# Patient Record
Sex: MALE | Race: ASIAN | NOT HISPANIC OR LATINO | URBAN - METROPOLITAN AREA
[De-identification: names, ages, dates, MRNs, and addresses within clinical notes are randomized per-mention and may not be internally consistent; named-entity substitution may affect disease eponyms.]

---

## 2022-10-24 ENCOUNTER — INPATIENT (INPATIENT)
Facility: HOSPITAL | Age: 32
LOS: 2 days | Discharge: HOME | End: 2022-10-27
Attending: INTERNAL MEDICINE | Admitting: INTERNAL MEDICINE

## 2022-10-24 VITALS
SYSTOLIC BLOOD PRESSURE: 161 MMHG | HEART RATE: 118 BPM | DIASTOLIC BLOOD PRESSURE: 90 MMHG | RESPIRATION RATE: 16 BRPM | TEMPERATURE: 98 F | OXYGEN SATURATION: 100 %

## 2022-10-24 DIAGNOSIS — L03.213 PERIORBITAL CELLULITIS: ICD-10-CM

## 2022-10-24 DIAGNOSIS — J32.0 CHRONIC MAXILLARY SINUSITIS: ICD-10-CM

## 2022-10-24 DIAGNOSIS — Z29.9 ENCOUNTER FOR PROPHYLACTIC MEASURES, UNSPECIFIED: ICD-10-CM

## 2022-10-24 LAB
ALBUMIN SERPL ELPH-MCNC: 4.3 G/DL — SIGNIFICANT CHANGE UP (ref 3.3–5)
ALP SERPL-CCNC: 75 U/L — SIGNIFICANT CHANGE UP (ref 40–120)
ALT FLD-CCNC: 23 U/L — SIGNIFICANT CHANGE UP (ref 4–41)
ANION GAP SERPL CALC-SCNC: 10 MMOL/L — SIGNIFICANT CHANGE UP (ref 7–14)
ANISOCYTOSIS BLD QL: SLIGHT — SIGNIFICANT CHANGE UP
AST SERPL-CCNC: 21 U/L — SIGNIFICANT CHANGE UP (ref 4–40)
BASE EXCESS BLDV CALC-SCNC: 5.3 MMOL/L — HIGH (ref -2–3)
BASOPHILS # BLD AUTO: 0 K/UL — SIGNIFICANT CHANGE UP (ref 0–0.2)
BASOPHILS NFR BLD AUTO: 0 % — SIGNIFICANT CHANGE UP (ref 0–2)
BILIRUB SERPL-MCNC: 0.4 MG/DL — SIGNIFICANT CHANGE UP (ref 0.2–1.2)
BLOOD GAS VENOUS COMPREHENSIVE RESULT: SIGNIFICANT CHANGE UP
BUN SERPL-MCNC: 9 MG/DL — SIGNIFICANT CHANGE UP (ref 7–23)
CALCIUM SERPL-MCNC: 9.7 MG/DL — SIGNIFICANT CHANGE UP (ref 8.4–10.5)
CHLORIDE BLDV-SCNC: 99 MMOL/L — SIGNIFICANT CHANGE UP (ref 96–108)
CHLORIDE SERPL-SCNC: 99 MMOL/L — SIGNIFICANT CHANGE UP (ref 98–107)
CO2 BLDV-SCNC: 32.6 MMOL/L — HIGH (ref 22–26)
CO2 SERPL-SCNC: 28 MMOL/L — SIGNIFICANT CHANGE UP (ref 22–31)
CREAT SERPL-MCNC: 0.74 MG/DL — SIGNIFICANT CHANGE UP (ref 0.5–1.3)
CRP SERPL-MCNC: 59.9 MG/L — HIGH
EGFR: 123 ML/MIN/1.73M2 — SIGNIFICANT CHANGE UP
EOSINOPHIL # BLD AUTO: 0 K/UL — SIGNIFICANT CHANGE UP (ref 0–0.5)
EOSINOPHIL NFR BLD AUTO: 0 % — SIGNIFICANT CHANGE UP (ref 0–6)
ERYTHROCYTE [SEDIMENTATION RATE] IN BLOOD: 27 MM/HR — HIGH (ref 1–15)
GAS PNL BLDV: 133 MMOL/L — LOW (ref 136–145)
GIANT PLATELETS BLD QL SMEAR: PRESENT — SIGNIFICANT CHANGE UP
GLUCOSE BLDV-MCNC: 114 MG/DL — HIGH (ref 70–99)
GLUCOSE SERPL-MCNC: 107 MG/DL — HIGH (ref 70–99)
HCO3 BLDV-SCNC: 31 MMOL/L — HIGH (ref 22–29)
HCT VFR BLD CALC: 42.5 % — SIGNIFICANT CHANGE UP (ref 39–50)
HCT VFR BLDA CALC: 43 % — SIGNIFICANT CHANGE UP (ref 39–51)
HGB BLD CALC-MCNC: 14.2 G/DL — SIGNIFICANT CHANGE UP (ref 13–17)
HGB BLD-MCNC: 14 G/DL — SIGNIFICANT CHANGE UP (ref 13–17)
IANC: 13.27 K/UL — HIGH (ref 1.8–7.4)
LACTATE BLDV-MCNC: 1.2 MMOL/L — SIGNIFICANT CHANGE UP (ref 0.5–2)
LYMPHOCYTES # BLD AUTO: 1.86 K/UL — SIGNIFICANT CHANGE UP (ref 1–3.3)
LYMPHOCYTES # BLD AUTO: 10.5 % — LOW (ref 13–44)
MACROCYTES BLD QL: SLIGHT — SIGNIFICANT CHANGE UP
MANUAL SMEAR VERIFICATION: SIGNIFICANT CHANGE UP
MCHC RBC-ENTMCNC: 30.8 PG — SIGNIFICANT CHANGE UP (ref 27–34)
MCHC RBC-ENTMCNC: 32.9 GM/DL — SIGNIFICANT CHANGE UP (ref 32–36)
MCV RBC AUTO: 93.6 FL — SIGNIFICANT CHANGE UP (ref 80–100)
MONOCYTES # BLD AUTO: 1.4 K/UL — HIGH (ref 0–0.9)
MONOCYTES NFR BLD AUTO: 7.9 % — SIGNIFICANT CHANGE UP (ref 2–14)
NEUTROPHILS # BLD AUTO: 14.02 K/UL — HIGH (ref 1.8–7.4)
NEUTROPHILS NFR BLD AUTO: 79 % — HIGH (ref 43–77)
OVALOCYTES BLD QL SMEAR: SLIGHT — SIGNIFICANT CHANGE UP
PCO2 BLDV: 49 MMHG — SIGNIFICANT CHANGE UP (ref 42–55)
PH BLDV: 7.41 — SIGNIFICANT CHANGE UP (ref 7.32–7.43)
PLAT MORPH BLD: ABNORMAL
PLATELET # BLD AUTO: 224 K/UL — SIGNIFICANT CHANGE UP (ref 150–400)
PLATELET COUNT - ESTIMATE: NORMAL — SIGNIFICANT CHANGE UP
PO2 BLDV: 37 MMHG — SIGNIFICANT CHANGE UP
POLYCHROMASIA BLD QL SMEAR: SLIGHT — SIGNIFICANT CHANGE UP
POTASSIUM BLDV-SCNC: 3.5 MMOL/L — SIGNIFICANT CHANGE UP (ref 3.5–5.1)
POTASSIUM SERPL-MCNC: 3.9 MMOL/L — SIGNIFICANT CHANGE UP (ref 3.5–5.3)
POTASSIUM SERPL-SCNC: 3.9 MMOL/L — SIGNIFICANT CHANGE UP (ref 3.5–5.3)
PROT SERPL-MCNC: 7.9 G/DL — SIGNIFICANT CHANGE UP (ref 6–8.3)
RBC # BLD: 4.54 M/UL — SIGNIFICANT CHANGE UP (ref 4.2–5.8)
RBC # FLD: 11.8 % — SIGNIFICANT CHANGE UP (ref 10.3–14.5)
RBC BLD AUTO: ABNORMAL
SAO2 % BLDV: 68.1 % — SIGNIFICANT CHANGE UP
SARS-COV-2 RNA SPEC QL NAA+PROBE: SIGNIFICANT CHANGE UP
SODIUM SERPL-SCNC: 137 MMOL/L — SIGNIFICANT CHANGE UP (ref 135–145)
VARIANT LYMPHS # BLD: 2.6 % — SIGNIFICANT CHANGE UP (ref 0–6)
WBC # BLD: 17.75 K/UL — HIGH (ref 3.8–10.5)
WBC # FLD AUTO: 17.75 K/UL — HIGH (ref 3.8–10.5)

## 2022-10-24 PROCEDURE — 99221 1ST HOSP IP/OBS SF/LOW 40: CPT

## 2022-10-24 PROCEDURE — 93010 ELECTROCARDIOGRAM REPORT: CPT

## 2022-10-24 PROCEDURE — 99285 EMERGENCY DEPT VISIT HI MDM: CPT

## 2022-10-24 PROCEDURE — 70481 CT ORBIT/EAR/FOSSA W/DYE: CPT | Mod: 26,MA

## 2022-10-24 RX ORDER — OXYMETAZOLINE HYDROCHLORIDE 0.5 MG/ML
1 SPRAY NASAL
Refills: 0 | Status: DISCONTINUED | OUTPATIENT
Start: 2022-10-24 | End: 2022-10-27

## 2022-10-24 RX ORDER — SODIUM CHLORIDE 9 MG/ML
1000 INJECTION INTRAMUSCULAR; INTRAVENOUS; SUBCUTANEOUS ONCE
Refills: 0 | Status: COMPLETED | OUTPATIENT
Start: 2022-10-24 | End: 2022-10-24

## 2022-10-24 RX ORDER — ACETAMINOPHEN 500 MG
650 TABLET ORAL ONCE
Refills: 0 | Status: COMPLETED | OUTPATIENT
Start: 2022-10-24 | End: 2022-10-24

## 2022-10-24 RX ORDER — ENOXAPARIN SODIUM 100 MG/ML
40 INJECTION SUBCUTANEOUS EVERY 24 HOURS
Refills: 0 | Status: DISCONTINUED | OUTPATIENT
Start: 2022-10-24 | End: 2022-10-27

## 2022-10-24 RX ORDER — AMPICILLIN SODIUM AND SULBACTAM SODIUM 250; 125 MG/ML; MG/ML
3 INJECTION, POWDER, FOR SUSPENSION INTRAMUSCULAR; INTRAVENOUS EVERY 6 HOURS
Refills: 0 | Status: DISCONTINUED | OUTPATIENT
Start: 2022-10-25 | End: 2022-10-27

## 2022-10-24 RX ORDER — NEOMYCIN/POLYMYXIN B/DEXAMETHA 0.1 %
1 SUSPENSION, DROPS(FINAL DOSAGE FORM)(ML) OPHTHALMIC (EYE)
Refills: 0 | Status: DISCONTINUED | OUTPATIENT
Start: 2022-10-24 | End: 2022-10-27

## 2022-10-24 RX ORDER — VANCOMYCIN HCL 1 G
1000 VIAL (EA) INTRAVENOUS EVERY 12 HOURS
Refills: 0 | Status: DISCONTINUED | OUTPATIENT
Start: 2022-10-24 | End: 2022-10-25

## 2022-10-24 RX ORDER — VANCOMYCIN HCL 1 G
1000 VIAL (EA) INTRAVENOUS ONCE
Refills: 0 | Status: COMPLETED | OUTPATIENT
Start: 2022-10-24 | End: 2022-10-24

## 2022-10-24 RX ORDER — KETOROLAC TROMETHAMINE 30 MG/ML
15 SYRINGE (ML) INJECTION ONCE
Refills: 0 | Status: DISCONTINUED | OUTPATIENT
Start: 2022-10-24 | End: 2022-10-24

## 2022-10-24 RX ORDER — NEOMYCIN/POLYMYXIN B/DEXAMETHA 0.1 %
1 SUSPENSION, DROPS(FINAL DOSAGE FORM)(ML) OPHTHALMIC (EYE) THREE TIMES A DAY
Refills: 0 | Status: DISCONTINUED | OUTPATIENT
Start: 2022-10-24 | End: 2022-10-24

## 2022-10-24 RX ORDER — AMPICILLIN SODIUM AND SULBACTAM SODIUM 250; 125 MG/ML; MG/ML
3 INJECTION, POWDER, FOR SUSPENSION INTRAMUSCULAR; INTRAVENOUS ONCE
Refills: 0 | Status: COMPLETED | OUTPATIENT
Start: 2022-10-24 | End: 2022-10-24

## 2022-10-24 RX ORDER — FLUTICASONE PROPIONATE 50 MCG
1 SPRAY, SUSPENSION NASAL
Refills: 0 | Status: DISCONTINUED | OUTPATIENT
Start: 2022-10-24 | End: 2022-10-27

## 2022-10-24 RX ORDER — AMPICILLIN SODIUM AND SULBACTAM SODIUM 250; 125 MG/ML; MG/ML
INJECTION, POWDER, FOR SUSPENSION INTRAMUSCULAR; INTRAVENOUS
Refills: 0 | Status: DISCONTINUED | OUTPATIENT
Start: 2022-10-24 | End: 2022-10-27

## 2022-10-24 RX ADMIN — AMPICILLIN SODIUM AND SULBACTAM SODIUM 200 GRAM(S): 250; 125 INJECTION, POWDER, FOR SUSPENSION INTRAMUSCULAR; INTRAVENOUS at 12:34

## 2022-10-24 RX ADMIN — AMPICILLIN SODIUM AND SULBACTAM SODIUM 200 GRAM(S): 250; 125 INJECTION, POWDER, FOR SUSPENSION INTRAMUSCULAR; INTRAVENOUS at 22:00

## 2022-10-24 RX ADMIN — Medication 250 MILLIGRAM(S): at 13:38

## 2022-10-24 RX ADMIN — OXYMETAZOLINE HYDROCHLORIDE 1 SPRAY(S): 0.5 SPRAY NASAL at 22:00

## 2022-10-24 RX ADMIN — Medication 1 SPRAY(S): at 18:50

## 2022-10-24 RX ADMIN — Medication 15 MILLIGRAM(S): at 10:43

## 2022-10-24 RX ADMIN — SODIUM CHLORIDE 1000 MILLILITER(S): 9 INJECTION INTRAMUSCULAR; INTRAVENOUS; SUBCUTANEOUS at 11:46

## 2022-10-24 RX ADMIN — Medication 650 MILLIGRAM(S): at 10:43

## 2022-10-24 RX ADMIN — Medication 1 DROP(S): at 22:01

## 2022-10-24 NOTE — ED ADULT NURSE NOTE - OBJECTIVE STATEMENT
pt received at intake rm 12 AAO x 3. pt c/o swelling and redness to right eye and inability to open right eye. pt states right eye has gotten worse over the last couple of months. pt treated with antibiotics with no relief. right eye noted to be red and swollen with inability to open eye. 20g iv placed to left ac.

## 2022-10-24 NOTE — CONSULT NOTE ADULT - ASSESSMENT
INCOMPLETE NOTE, FINAL RECS TO FOLLOW    Assessment and Recommendations:  32y male w/ pmhx/ochx of *** consulted for preseptal cellulitis.  #   - Findings and plan discussed with patient and primary team.    Patient seen and discussed with  ***    Outpatient follow-up: Patient should follow-up with his/her ophthalmologist or with [facility] Department of Ophthalmology at the address below     [address] [place] [address]. [address]  [place] [address]  [phone]     INCOMPLETE NOTE, FINAL RECS TO FOLLOW    Assessment and Recommendations:  32y male w/ pmhx/ochx of *** consulted for preseptal cellulitis.  #   - Afebrile, WBC ***  - Vision intact, no sign of optic nerve dysfunction  - 3+ periorbital edema and 2+ erythema with tenderness to palpation OD; no proptosis, tense lid edema, difficult to ascertain RTR, no discharge expressed from punctum  - 30% restriction in abduction OD, 10-20% restriction in supra and infraduction OD  - Posterior segment exam shows ***  - Pending CT orbits  - Recommend IV antibiotics given failure of outpatient antibiotics (Cefdinir and levaquin)  - Recommend MRSA swab  - Warm compress to the area TID  - Findings and plan discussed with patient and primary team.    Patient seen and discussed with  ***    Outpatient follow-up: Patient should follow-up with his/her ophthalmologist or with [facility] Department of Ophthalmology at the address below     [address] [place] [address]. [address]  [place] [address]  [phone]     INCOMPLETE NOTE, FINAL RECS TO FOLLOW    Assessment and Recommendations:  32y male w/ pmhx/ochx of none consulted for preseptal cellulitis. Found to have likely preseptal cellulitis and dacryocystitis of the right side  # Preseptal cellulitis with likely dacryocystitis, right side  - Afebrile, WBC 17  - Vision intact, no sign of optic nerve dysfunction  - 3+ periorbital edema and 2+ erythema with tenderness to palpation OD; no proptosis, soft lid edema, no RTR, no discharge expressed from punctum, nodule at medial meatus exquisitely tender to palpation  - Grossly full OD possibly 10-20% restriction on abduction OD; full OS  - Posterior segment exam shows no acute abnormalities  - Pending CT orbits  - Recommend IV antibiotics given failure of outpatient antibiotics (Cefdinir and levaquin)  - Recommend MRSA swab  - Warm compress to the area TID  - Findings and plan discussed with patient and primary team.    Patient seen and discussed with Dr. Hidalgo    Outpatient follow-up: Patient should follow-up with his/her ophthalmologist or with [facility] Department of Ophthalmology at the address below     [address] [place] [address]. [address]  [place] [address]  [phone]     Assessment and Recommendations:  32y male w/ pmhx/ochx of none consulted for preseptal cellulitis. Found to have likely preseptal cellulitis and dacryocystitis of the right side  # Preseptal cellulitis with likely dacryocystitis, right side  - Afebrile, WBC 17, CRP 60  - Vision intact, no sign of optic nerve dysfunction  - 3+ periorbital edema and 2+ erythema with tenderness to palpation OD; no proptosis, soft lid edema, no RTR, no discharge expressed from punctum, nodule at medial meatus exquisitely tender to palpation  - Grossly full OD possibly 10-20% restriction on abduction OD; full OS  - Posterior segment exam shows no acute abnormalities  - CT orbits with chronic sinusitis, left preseptal cellulitis with no abscess collection  - Recommend IV antibiotics given failure of outpatient antibiotics (Cefdinir and levaquin), consider ID consult  - Recommend MRSA swab  - Recommend Maxitrol drops QID right eye  - Recommend flonase  - Warm compress to the area TID  - Findings and plan discussed with patient and primary team.    Patient seen and discussed with Dr. Hidalgo; discussed with Dr Martinez, oculoplastics    Outpatient follow-up: Patient should follow-up with his/her ophthalmologist or with [facility] Department of Ophthalmology at the address below     [address] [place] [address]. [address]  [place] [address]  [phone]     Assessment and Recommendations:  32y male w/ pmhx/ochx of none consulted for preseptal cellulitis. Found to have likely preseptal cellulitis and dacryocystitis of the right side  # Preseptal cellulitis with dacryocystitis, right side  - Afebrile, WBC 17, CRP 60  - Vision intact, no sign of optic nerve dysfunction  - 3+ periorbital edema and 2+ erythema with tenderness to palpation OD; no proptosis, soft lid edema, no RTR, no discharge expressed from punctum, nodule at medial meatus exquisitely tender to palpation  - Grossly full OD possibly 10-20% restriction on abduction OD; full OS  - Posterior segment exam shows no acute abnormalities  - CT orbits with chronic sinusitis, left preseptal cellulitis with no abscess collection  - Recommend IV antibiotics given failure of outpatient antibiotics (Cefdinir and levaquin), consider ID consult  - Recommend ENT consult  - Recommend MRSA swab  - Recommend Maxitrol drops QID right eye  - Recommend flonase  - Warm compress to the area every hour  - Findings and plan discussed with patient and primary team  - will monitor closely  -case discussed with Dr. Martinez, ophthalmic plan per Dr. Martinez    Patient seen and discussed with Dr. Hidalgo; discussed with Dr Martinez, oculoplastics    Outpatient follow-up: Patient should follow-up with his/her ophthalmologist or with [facility] Department of Ophthalmology at the address below within 1-2 days of discharge, sooner if symptoms worsen or change.    [address] [facility]. [address]  [place] [address]  [phone]

## 2022-10-24 NOTE — H&P ADULT - PROBLEM SELECTOR PLAN 2
CT scan: 1.  Right-to-left nasal septal deviation and leftward directed septal spur as compromise left nasal cavity 2.  Carlota cells compromise each ostiomeatal complex, left larger than right 3. Sporadic pattern right-sided chronic sinusitis.  - ENT consulted, appreciate recs  - flonase bid  - afrin bid x3d --> then, saline nasal spray bid x3 weeks   - clindamycin  - pending ID consult CT scan: 1.  Right-to-left nasal septal deviation and leftward directed septal spur as compromise left nasal cavity 2.  Carlota cells compromise each ostiomeatal complex, left larger than right 3. Sporadic pattern right-sided chronic sinusitis.  - ENT consulted, appreciate recs  - flonase bid  - afrin bid x3d --> then, saline nasal spray bid x3 weeks   - unasyn 3g q6  - vanc 1g q12  - pending ID consult

## 2022-10-24 NOTE — ED ADULT TRIAGE NOTE - CHIEF COMPLAINT QUOTE
c/o right eye swelling and redness since Friday, noted to swollen shut with drainage. Denies trauma, fever, chills, no pmhx c/o right eye swelling and redness since Friday, noted to be swollen shut with drainage. Denies trauma, fever, chills, no pmhx

## 2022-10-24 NOTE — H&P ADULT - NSHPPHYSICALEXAM_GEN_ALL_CORE
VITALS:   T(C): 36.7 (10-24-22 @ 18:02), Max: 36.9 (10-24-22 @ 09:20)  HR: 99 (10-24-22 @ 18:02) (99 - 118)  BP: 129/78 (10-24-22 @ 18:02) (129/78 - 161/90)  RR: 18 (10-24-22 @ 18:02) (16 - 18)  SpO2: 99% (10-24-22 @ 18:02) (99% - 100%)    GENERAL: NAD, lying in bed comfortably  HEAD:  Atraumatic, normocephalic  EYES: EOMI, L PERRLA, R periorbiatal erythema and edema; swollen shut with some crusting around eyelid. Not tender to palpation but fluctuance felt around eye and down maxilla.    ENT: Moist mucous membranes, no pharyngeal erythema.   NECK: Supple, no JVD  HEART: Regular rate and rhythm, no murmurs, rubs, or gallops  LUNGS: Unlabored respirations.  Clear to auscultation bilaterally, no crackles, wheezing, or rhonchi  ABDOMEN: Soft, nontender, nondistended, +BS  EXTREMITIES: 2+ peripheral pulses bilaterally. No clubbing, cyanosis, or edema  NERVOUS SYSTEM:  A&Ox3, no focal deficits   SKIN: No rashes or lesions

## 2022-10-24 NOTE — CONSULT NOTE ADULT - SUBJECTIVE AND OBJECTIVE BOX
[facility] DEPARTMENT OF OPHTHALMOLOGY - INITIAL ADULT CONSULT  -----------------------------------------------------------------------------------------------------------------  Octaviano Lance MD PGY 3  [phone]  -----------------------------------------------------------------------------------------------------------------    HPI: 32M with no significant PMH presents to the ED for right upper eyelid swelling for 4 days. Reports that in April he started having tearing and light yellow discharge from the eyes, was put on tobramycin, Zylet, oflox, and amoxicillin with resolution, but return of symptoms in July. Was started on Bactrim, neomycin, resolved and restarted in Sept. Was given prednisolone drops with metonidazole but did not start. Tried 7 days of cefdinir last week from ENT and medrol dose pack with no benefit. Lately started Levaquin since the 22nd with no significant improvement. Patient was sent to ED by outside physician for concern of preseptal cellulitis and possible dacryocystitis.     PAST MEDICAL & SURGICAL HISTORY:    Past Ocular History: none  Ophthalmic Medications: none  FAMILY HISTORY:    Social History: ***    MEDICATIONS  (STANDING):    MEDICATIONS  (PRN):    Allergies & Intolerances:     Review of Systems:  Constitutional: No fever, chills  Eyes: No blurry vision, flashes, floaters, FBS, erythema, discharge, double vision, OU  Neuro: No tremors  Cardiovascular: No chest pain, palpitations  Respiratory: No SOB, no cough  GI: No nausea, vomiting, abdominal pain  : No dysuria  Skin: no rash  Psych: no depression  Endocrine: no polyuria, polydipsia  Heme/lymph: no swelling    VITALS: T(C): 36.9 (10-24-22 @ 09:20)  T(F): 98.5 (10-24-22 @ 09:20), Max: 98.5 (10-24-22 @ 09:20)  HR: 118 (10-24-22 @ 09:20) (118 - 118)  BP: 161/90 (10-24-22 @ 09:20) (161/90 - 161/90)  RR:  (16 - 16)  SpO2:  (100% - 100%)  Wt(kg): --  General: AAO x 3, appropriate mood and affect    Ophthalmology Exam:  Visual acuity (sc): 20/20 OD, 20/20 OS  Pupils: PERRL OU, no APD  Ttono: 16 OD, 16 OS  Extraocular movements (EOMs): Full OU, no pain, no diplopia  Confrontational Visual Field (CVF): Full OD, full OS  Color Plates: 12/12 OD, 12/12 OS    Pen Light Exam (PLE)  External: Flat OU  Lids/Lashes/Lacrimal Ducts: Flat OU    Sclera/Conjunctiva: W+Q OU  Cornea: Cl OU  Anterior Chamber: D+F OU    Iris: Flat OU  Lens: Cl OU    Fundus Exam: dilated with 1% tropicamide and 2.5% phenylephrine  Approval obtained from primary team for dilation  Patient aware that pupils can remained dilated for at least 4-6 hours  Exam performed with 20D lens    Vitreous: wnl OU  Disc, cup/disc: sharp and pink, 0.4 OU  Macula: wnl OU  Vessels: wnl OU  Periphery: wnl OU    Labs/Imaging:  ***   [facility] DEPARTMENT OF OPHTHALMOLOGY - INITIAL ADULT CONSULT  -----------------------------------------------------------------------------------------------------------------  Octaviano Lance MD PGY 3  [phone]  -----------------------------------------------------------------------------------------------------------------    HPI: 32M with no significant PMH presents to the ED for right upper eyelid swelling for 4 days. Reports that in April he started having tearing and light yellow discharge from the eyes, was put on tobramycin, Zylet, oflox, and amoxicillin with resolution, but return of symptoms in July. Was started on Bactrim, neomycin, resolved and restarted in Sept. Was given prednisolone drops with metonidazole but did not start. Tried 7 days of cefdinir last week from ENT and medrol dose pack with no benefit. Lately started Levaquin since the 22nd with no significant improvement. Denies loss of vision, bug bite, trauma, styes to the eye. Denies fevers, chills, night sweats. Patient was sent to ED by outside physician for concern of preseptal cellulitis and possible dacryocystitis.     PAST MEDICAL & SURGICAL HISTORY:    Past Ocular History: none  Ophthalmic Medications: none  FAMILY HISTORY:    Social History: denies etoh/tobacco    MEDICATIONS  (STANDING):    MEDICATIONS  (PRN):    Allergies & Intolerances:     Review of Systems:  Constitutional: No fever, chills  Eyes: No blurry vision, flashes, floaters, FBS, erythema, discharge, double vision, OU  Neuro: No tremors  Cardiovascular: No chest pain, palpitations  Respiratory: No SOB, no cough  GI: No nausea, vomiting, abdominal pain  : No dysuria  Skin: no rash  Psych: no depression  Endocrine: no polyuria, polydipsia  Heme/lymph: no swelling    VITALS: T(C): 36.9 (10-24-22 @ 09:20)  T(F): 98.5 (10-24-22 @ 09:20), Max: 98.5 (10-24-22 @ 09:20)  HR: 118 (10-24-22 @ 09:20) (118 - 118)  BP: 161/90 (10-24-22 @ 09:20) (161/90 - 161/90)  RR:  (16 - 16)  SpO2:  (100% - 100%)  Wt(kg): --  General: AAO x 3, appropriate mood and affect    Ophthalmology Exam:  Visual acuity (sc): 20/25 OD, 20/20 OS  Pupils: PERRL OU, no APD  Ttono: 18 OD, 25 OS  Extraocular movements (EOMs): 30% restriction in abduction OD, 10-20% restriction in supra and infraduction OD; full OS  Confrontational Visual Field (CVF): Full OD, full OS  Color Plates: 12/12 OD, 12/12 OS    Pen Light Exam (PLE)  External: 3+ periorbital edema and 2+ erythema with tenderness to palpation OD; Flat OS; no proptosis, tense lid edema, difficult to ascertain RTR  Lids/Lashes/Lacrimal Ducts: 3+ periorbital edema and 2+ erythema with tenderness to palpation OD; Flat OS; no discharge expressed from punctum  Sclera/Conjunctiva: 1+ temporal chemosis OD; W+Q OS  Cornea: Cl OU  Anterior Chamber: D+F OU    Iris: Flat OU  Lens: Cl OU    Fundus Exam: dilated with 1% tropicamide and 2.5% phenylephrine  Approval obtained from primary team for dilation  Patient aware that pupils can remained dilated for at least 4-6 hours  Exam performed with 20D lens    Vitreous: wnl OU  Disc, cup/disc: sharp and pink, 0.4 OU  Macula: wnl OU  Vessels: wnl OU  Periphery: wnl OU    Labs/Imaging:  ***   [facility] DEPARTMENT OF OPHTHALMOLOGY - INITIAL ADULT CONSULT  -----------------------------------------------------------------------------------------------------------------  Octaviano Lance MD PGY 3  [phone]  -----------------------------------------------------------------------------------------------------------------    HPI: 32M with no significant PMH presents to the ED for right upper eyelid swelling for 4 days. Reports that in April he started having tearing and light yellow discharge from the eyes, was put on tobramycin, Zylet, oflox, and amoxicillin with resolution, but return of symptoms in July. Was started on Bactrim, neomycin, resolved and restarted in Sept. Was given prednisolone drops with metonidazole but did not start. Tried 7 days of cefdinir last week from ENT and medrol dose pack with no benefit. Lately started Levaquin since the 22nd with no significant improvement. Denies loss of vision, bug bite, trauma, styes to the eye. Denies fevers, chills, night sweats. Patient was sent to ED by outside physician for concern of preseptal cellulitis and possible dacryocystitis.     PAST MEDICAL & SURGICAL HISTORY:    Past Ocular History: none  Ophthalmic Medications: none  FAMILY HISTORY:    Social History: denies etoh/tobacco    MEDICATIONS  (STANDING):    MEDICATIONS  (PRN):    Allergies & Intolerances:     Review of Systems:  Constitutional: No fever, chills  Eyes: No blurry vision, flashes, floaters, FBS, erythema, discharge, double vision, OU  Neuro: No tremors  Cardiovascular: No chest pain, palpitations  Respiratory: No SOB, no cough  GI: No nausea, vomiting, abdominal pain  : No dysuria  Skin: no rash  Psych: no depression  Endocrine: no polyuria, polydipsia  Heme/lymph: no swelling    VITALS: T(C): 36.9 (10-24-22 @ 09:20)  T(F): 98.5 (10-24-22 @ 09:20), Max: 98.5 (10-24-22 @ 09:20)  HR: 118 (10-24-22 @ 09:20) (118 - 118)  BP: 161/90 (10-24-22 @ 09:20) (161/90 - 161/90)  RR:  (16 - 16)  SpO2:  (100% - 100%)  Wt(kg): --  General: AAO x 3, appropriate mood and affect    Ophthalmology Exam:  Visual acuity (sc): 20/25 OD, 20/20 OS  Pupils: PERRL OU, no APD  Ttono: 18 OD, 25 OS  Extraocular movements (EOMs): Grossly full OD possibly 10-20% restriction on abduction OD; full OS  Confrontational Visual Field (CVF): Full OD, full OS  Color Plates: 12/12 OD, 12/12 OS    Pen Light Exam (PLE)  External: 3+ periorbital edema and 2+ erythema with tenderness to palpation OD; Flat OS; no proptosis, soft lid edema, no RTR  Lids/Lashes/Lacrimal Ducts: 3+ periorbital edema and 2+ erythema with tenderness to palpation OD; Flat OS; no discharge expressed from punctum; nodule at medial meatus exquisitely tender to palpation  Sclera/Conjunctiva: 1+ temporal chemosis OD; W+Q OS  Cornea: Cl OU  Anterior Chamber: D+F OU    Iris: Flat OU  Lens: Cl OU    Fundus Exam: dilated with 1% tropicamide and 2.5% phenylephrine  Approval obtained from primary team for dilation  Patient aware that pupils can remained dilated for at least 4-6 hours  Exam performed with 20D lens    Vitreous: wnl OU  Disc, cup/disc: sharp and pink, 0.4 OU  Macula: wnl OU  Vessels: wnl OU  Periphery: wnl OU    Labs/Imaging:  ***   [facility] DEPARTMENT OF OPHTHALMOLOGY - INITIAL ADULT CONSULT  -----------------------------------------------------------------------------------------------------------------  Octaviano Lance MD PGY 3  [phone]  -----------------------------------------------------------------------------------------------------------------    HPI: 32M with no significant PMH presents to the ED for right upper eyelid swelling for 4 days. Reports that in April he started having tearing and light yellow discharge from the eyes, was put on tobramycin, Zylet, oflox, and amoxicillin with resolution, but return of symptoms in July. Was started on Bactrim, neomycin, resolved and restarted in Sept. Was given prednisolone drops with metonidazole but did not start. Tried 7 days of cefdinir last week from ENT and medrol dose pack with no benefit. Lately started Levaquin since the 22nd with no significant improvement. Denies loss of vision, bug bite, trauma, styes to the eye. Denies fevers, chills, night sweats. Patient was sent to ED by outside physician for concern of preseptal cellulitis and possible dacryocystitis.     PAST MEDICAL & SURGICAL HISTORY:    Past Ocular History: none  Ophthalmic Medications: none  FAMILY HISTORY:    Social History: denies etoh/tobacco    MEDICATIONS  (STANDING):    MEDICATIONS  (PRN):    Allergies & Intolerances:     Review of Systems:  Constitutional: No fever, chills  Eyes: No blurry vision, flashes, floaters, FBS, erythema, discharge, double vision, OU  Neuro: No tremors  Cardiovascular: No chest pain, palpitations  Respiratory: No SOB, no cough  GI: No nausea, vomiting, abdominal pain  : No dysuria  Skin: no rash  Psych: no depression  Endocrine: no polyuria, polydipsia  Heme/lymph: no swelling    VITALS: T(C): 36.9 (10-24-22 @ 09:20)  T(F): 98.5 (10-24-22 @ 09:20), Max: 98.5 (10-24-22 @ 09:20)  HR: 118 (10-24-22 @ 09:20) (118 - 118)  BP: 161/90 (10-24-22 @ 09:20) (161/90 - 161/90)  RR:  (16 - 16)  SpO2:  (100% - 100%)  Wt(kg): --  General: AAO x 3, appropriate mood and affect    Ophthalmology Exam:  Visual acuity (sc): 20/25 OD, 20/20 OS  Pupils: PERRL OU, no APD  Ttono: 18 OD, 25 OS  Extraocular movements (EOMs): Grossly full OD possibly 10-20% restriction on abduction OD; full OS  Confrontational Visual Field (CVF): Full OD, full OS  Color Plates: 12/12 OD, 12/12 OS    Pen Light Exam (PLE)  External: 3+ periorbital edema and 2+ erythema with tenderness to palpation OD; Flat OS; no proptosis, soft lid edema, no RTR  Lids/Lashes/Lacrimal Ducts: 3+ periorbital edema and 2+ erythema with tenderness to palpation OD; Flat OS; no discharge expressed from punctum; nodule at medial meatus exquisitely tender to palpation  Sclera/Conjunctiva: 1+ temporal chemosis OD; W+Q OS  Cornea: Cl OU  Anterior Chamber: D+F OU    Iris: Flat OU  Lens: Cl OU    Fundus Exam: dilated with 1% tropicamide and 2.5% phenylephrine  Approval obtained from primary team for dilation  Patient aware that pupils can remained dilated for at least 4-6 hours  Exam performed with 20D lens    Vitreous: wnl OU  Disc, cup/disc: sharp and pink, 0.4 OU  Macula: wnl OU  Vessels: wnl OU  Periphery: wnl OU    Labs/Imaging:  IMPRESSION:    1.  Right-to-left nasal septal deviation and leftward directed septal   spur as compromise left nasal cavity    2.  Carlota cells compromise each ostiomeatal complex, left larger than   right    3. Sporadic pattern right-sided chronic sinusitis    4. Left orbital preseptal cellulitis. No abscess collection. No   underlying acute sinusitis    --- End of Report ---        CONI ORTEGA MD; Attending Radiologist  This document has been electronically signed. Oct 24 2022  2:47PM [facility] DEPARTMENT OF OPHTHALMOLOGY - INITIAL ADULT CONSULT  -----------------------------------------------------------------------------------------------------------------  Octaviano Lance MD PGY 3  [phone]  -----------------------------------------------------------------------------------------------------------------    HPI: 32M with no significant PMH presents to the ED for right upper eyelid swelling for 4 days. Reports that in April he started having tearing and light yellow discharge from the eyes, was put on tobramycin, Zylet, oflox, and amoxicillin with resolution, but return of symptoms in July. Was started on Bactrim, neomycin, resolved and restarted in Sept. Was given prednisolone drops with metonidazole but did not start. Tried 7 days of cefdinir last week from ENT and medrol dose pack with no benefit. Lately started Levaquin since the 22nd with no significant improvement. Denies loss of vision, bug bite, trauma, styes to the eye. Denies fevers, chills, night sweats. Patient was sent to ED by outside physician for concern of preseptal cellulitis and possible dacryocystitis.     PAST MEDICAL & SURGICAL HISTORY:    Past Ocular History: none  Ophthalmic Medications: none  FAMILY HISTORY: no glaucoma, no ARMD    Social History: denies etoh/tobacco    MEDICATIONS  (STANDING):    MEDICATIONS  (PRN):    Allergies & Intolerances: NKDA    Review of Systems:  Constitutional: No fever, chills  Eyes: No blurry vision, flashes, floaters, FBS, erythema, discharge, double vision, OU  Neuro: No tremors  Cardiovascular: No chest pain, palpitations  Respiratory: No SOB, no cough  GI: No nausea, vomiting, abdominal pain  : No dysuria  Skin: no rash  Psych: no depression  Endocrine: no polyuria, polydipsia  Heme/lymph: no swelling    VITALS: T(C): 36.9 (10-24-22 @ 09:20)  T(F): 98.5 (10-24-22 @ 09:20), Max: 98.5 (10-24-22 @ 09:20)  HR: 118 (10-24-22 @ 09:20) (118 - 118)  BP: 161/90 (10-24-22 @ 09:20) (161/90 - 161/90)  RR:  (16 - 16)  SpO2:  (100% - 100%)  Wt(kg): --  General: AAO x 3, appropriate mood and affect    Ophthalmology Exam:  Visual acuity (sc): 20/25 OD, 20/20 OS  Pupils: PERRL OU, no APD  Ttono: 25 OD, 18 OS  Extraocular movements (EOMs): Grossly full OD possibly 10-20% restriction on abduction vs secondary to chemosis OD; full OS  Confrontational Visual Field (CVF): Full OD, full OS  Color Plates: 12/12 OD, 12/12 OS    Pen Light Exam (PLE)  External: 3+ periorbital edema and 2+ erythema with tenderness to palpation OD; Flat OS; no proptosis, soft lid edema, no RTR  Lids/Lashes/Lacrimal Ducts: 3+ periorbital edema and 2+ erythema with tenderness to palpation OD; Flat OS; no discharge expressed from punctum; nodule at medial meatus exquisitely tender to palpation  Sclera/Conjunctiva: 1+ temporal chemosis OD; W+Q OS  Cornea: Cl OU  Anterior Chamber: D+F OU    Iris: Flat OU  Lens: Cl OU    Fundus Exam: dilated with 1% tropicamide and 2.5% phenylephrine  Approval obtained from primary team for dilation  Patient aware that pupils can remained dilated for at least 4-6 hours  Exam performed with 20D lens    Vitreous: wnl OU  Disc, cup/disc: sharp and pink, 0.2 OU  Macula: wnl OU  Vessels: wnl OU  Periphery: wnl OU    Labs/Imaging:  IMPRESSION:    1.  Right-to-left nasal septal deviation and leftward directed septal   spur as compromise left nasal cavity    2.  Carlota cells compromise each ostiomeatal complex, left larger than   right    3. Sporadic pattern right-sided chronic sinusitis    4. Left orbital preseptal cellulitis. No abscess collection. No   underlying acute sinusitis    --- End of Report ---        CONI ORTEGA MD; Attending Radiologist  This document has been electronically signed. Oct 24 2022  2:47PM

## 2022-10-24 NOTE — PATIENT PROFILE ADULT - FUNCTIONAL ASSESSMENT - DAILY ACTIVITY 3.
[place], [place]                                   CARDIOLOGY REPORT    NAME:             NICK VILLARREAL    AGE:          77  ACCT#:            [id number]         :          1941  MR#:              [id number]         ROOM:            ADMIT DATE:       2018        DIS DATE:     2018  PT TYPE:          O                 DP:           [id number]  ATTENDING:        VERONICA REEDER MD      DICTATING PHYSICIAN:  VERONICA REEDER MD    DATE OF PROCEDURE:  2018        CARDIAC CATHETERIZATION REPORT    PROCEDURE PERFORMED:    1. Left heart catheterization.  2. Left ventricular angiography.  3. Left and right coronary artery angiography.  4. Ascending aortography.  5. Right iliofemoral arteriography.  6. Deployment of a #6-[place]ian Angio-Seal device.    INDICATIONS:  A 77-year-old  female who has a history of hypertension,  coronary artery disease, obstructive sleep apnea, who presented with fatigue.  Her Myoview stress test showed anterior wall reversible perfusion defect with  normal ejection fraction.  She now presented for cardiac catheterization to  evaluate for coronary ischemia.     DESCRIPTION OF PROCEDURE:  The patient received intravenous Versed and fentanyl  for moderate sedation.  Sedation was administered and monitored by the  .  Pulse oximeter and vital signs were monitored throughout.  Total  sedation time was 30 minutes.     The right groin was infiltrated with 2% lidocaine for local anesthesia.  The  right femoral artery was punctured and a #6-[place]ian sheath was introduced.  A  #6-[place]ian JL4 catheter was introduced and was used for left coronary artery  angiography.  A JL4 catheter was then exchanged  for a #6-Nauruan JR4 catheter,  which was used for right coronary artery angiography.  A JR4 catheter was then  exchanged for a #6-Nauruan pigtail catheter, which was used for left ventricular  angiography and ascending aortography.  Pigtail catheter was then withdrawn.  Right iliofemoral arteriography was performed using the side arm of the  arterial sheath.  The puncture site was the right common femoral artery.  The  right common femoral artery was closed with a #6-Nauruan Angio-Seal device.     CORONARY ARTERY ANGIOGRAPHY:    1. Left main coronary artery was a large-sized vessel, which bifurcates into  left anterior descending artery and left circumflex artery.  Left main coronary  artery was angiographically normal.   2. The left anterior descending artery was a large-caliber vessel with a  proximal 50% segmental stenosis.  Two diagonal branches were seen.  Both were  angiographically normal.   3. The left circumflex artery had a proximal 30% stenosis.  Two obtuse marginal  branches were seen.  Both were angiographically normal.  The left circumflex  artery is a dominant vessel giving rise to the left posterior descending  artery, which was angiographically normal.   4. The right coronary artery was a medium-sized vessel, which was  angiographically normal.  It was a nondominant vessel.   5. Ejection fraction 65%.  6. There was premature ventricular beats-induced mitral valve regurgitation.  7. Normal ascending aorta.  8. Angiographically normal right common femoral artery with a successful  deployment of a #6-Nauruan Angio-Seal device.     CONCLUSION:    1. A 50% proximal segmental stenosis of the left anterior descending artery.  2. A 30% proximal stenosis of the left circumflex artery.  3. Nondominant angiographically normal right coronary artery.  Ejection  fraction 65%.   4. Normal ascending aorta.  5. Deployment of a #6-Nauruan Angio-Seal device to the right common femoral  artery.   6. Successful moderate  sedation.        ______________________________  MD ETHAN Laguna/MedLUIS ALBERTO  DP:  [id number]  DD:  12/07/2018 11:27:51  DT:  12/07/2018 12:39:13  Job #:  [id number]         4 = No assist / stand by assistance

## 2022-10-24 NOTE — ED PROVIDER NOTE - PHYSICAL EXAMINATION
PHYSICAL EXAM:    GENERAL: NAD, lying in bed comfortably  HEAD:  Atraumatic, Normocephalic  EYES: Swelling of skin, upper/lower eyelid of right eye. Excessive tearing noted. Pupils is normally reactive, visualized conjunctiva is normal, EOM movement intact, VA 20/20.   ENT: No erythema/pallor/petechiae/lesions  NECK: Supple  LUNG: CTA b/l; no r/r/w  HEART: RRR, +S1/S2; No m/r/g  ABDOMEN: soft, NT/ND; BS audible   EXTREMITIES:  2+ Peripheral Pulses, brisk cap refill. No clubbing, cyanosis, or edema  NERVOUS SYSTEM:  AAOx3, speech clear. No sensory/motor deficits   SKIN: No rashes or lesions

## 2022-10-24 NOTE — CONSULT NOTE ADULT - ASSESSMENT
32yoM PMH CRS p/w R preseptal cellulitis refractory to OP management  - saline nasal irrigations 3-4 times a day: take toomi syringe and flush 30cc of sterile saline into each nostril over a bucket  - flonase twice a day   - afrin twice a day (3 days total only)  - IV unasyn, transition to PO with clinical improvement  - appreciate ophtho, ID recs  - follow up outpatient with patient's ENT, Dr. Rosas  32yoM PMH CRS p/w R preseptal cellulitis refractory to OP management  - saline nasal irrigations 3-4 times a day: take toomi syringe and flush 30cc of sterile saline into each nostril over a bucket  - flonase twice a day   - afrin twice a day (3 days total only)  - then - saline nasal spray BID x 3 weeks  - Unasyn  - appreciate ophtho, ID recs  - will follow 32yoM PMH CRS p/w R preseptal cellulitis refractory to OP management  - saline nasal irrigations 3-4 times a day: take toomi syringe and flush 30cc of sterile saline into each nostril over a bucket  - flonase twice a day   - afrin twice a day (3 days total only)  - then - saline nasal spray BID x 3 weeks  - Clindamycin, pending ID consult (MRSA coverage)  - appreciate elder, ID recs  - will follow 32yoM PMH CRS p/w R preseptal cellulitis refractory to OP management  - saline nasal irrigations 3-4 times a day: take toomi syringe and flush 30cc of sterile saline into each nostril over a bucket  - flonase twice a day   - afrin twice a day (3 days total only)  - then - saline nasal spray BID x 3 weeks  - unasyn/vanco x 48 hrs  - appreciate ophtho, ID recs  - will follow

## 2022-10-24 NOTE — ED PROVIDER NOTE - NS ED ROS FT
CONSTITUTIONAL: No weakness, fevers or chills  EYES/ENT: No blurry vision, flashes, floaters, FBS, erythema, discharge, double vision,;  No vertigo or throat pain   NECK: No pain or stiffness  RESPIRATORY: No cough, shortness of breath  CARDIOVASCULAR: No chest pain or palpitations  GASTROINTESTINAL: No abdominal or epigastric pain. No nausea, vomiting, or hematemesis; No diarrhea or constipation.   GENITOURINARY: No dysuria, frequency   NEUROLOGICAL: No numbness or weakness  SKIN: No rashes, or lesions     All other review of systems is negative unless indicated above.

## 2022-10-24 NOTE — ED PROVIDER NOTE - ATTENDING CONTRIBUTION TO CARE
31 yo M recent hx preseptal cellulitis treated by ophthalmology since April with resolution of sx and return of sx intermittently since that time. Sx returned in July and again in September. Sent in today by ophthalmology to r/o septal cellulitis. Denies fevers/chills. On exam, pt tachycardic to 117, afebrile, R eye swollen shut with discharge noted-- no proptosis, EOMI intact without tenderness with movement. Scleral injection and chemosis. Appreciate early ophthalmology c/s and recs. Plan for labs, abx, CT orbits, likely cdu vs admit for iv abx

## 2022-10-24 NOTE — PATIENT PROFILE ADULT - FUNCTIONAL ASSESSMENT - BASIC MOBILITY SCORE.
NAPROXEN      Last Written Prescription Date:  11.29.2021  Last Fill Quantity: 60,   # refills: 0  Last Office Visit: 11.29.21          
24

## 2022-10-24 NOTE — H&P ADULT - NSHPREVIEWOFSYSTEMS_GEN_ALL_CORE
REVIEW OF SYSTEMS:  CONSTITUTIONAL: No weakness, fevers or chills  EYES/ENT: No visual changes; +R eye swelling, redness. No eye pain. No current discharge but has had clear-yellow discharge since April. History of nasal septal defect.   RESPIRATORY: No cough, wheezing, hemoptysis; No shortness of breath  CARDIOVASCULAR: No chest pain or palpitations  GASTROINTESTINAL: No abdominal or epigastric pain. No nausea, vomiting, or hematemesis; No diarrhea or constipation. No melena or hematochezia.  GENITOURINARY: No dysuria, frequency or hematuria  NEUROLOGICAL: No numbness or weakness  SKIN: No itching, rashes

## 2022-10-24 NOTE — H&P ADULT - NSHPLABSRESULTS_GEN_ALL_CORE
14.0   17.75 )-----------( 224      ( 24 Oct 2022 10:52 )             42.5     10-24    137  |  99  |  9   ----------------------------<  107<H>  3.9   |  28  |  0.74    Ca    9.7      24 Oct 2022 10:52    TPro  7.9  /  Alb  4.3  /  TBili  0.4  /  DBili  x   /  AST  21  /  ALT  23  /  AlkPhos  75  10-24      LIVER FUNCTIONS - ( 24 Oct 2022 10:52 )  Alb: 4.3 g/dL / Pro: 7.9 g/dL / ALK PHOS: 75 U/L / ALT: 23 U/L / AST: 21 U/L / GGT: x               EKG:     RADIOLOGY STUDIES:

## 2022-10-24 NOTE — ED ADULT NURSE NOTE - CHIEF COMPLAINT QUOTE
c/o right eye swelling and redness since Friday, noted to be swollen shut with drainage. Denies trauma, fever, chills, no pmhx

## 2022-10-24 NOTE — CONSULT NOTE ADULT - ATTENDING COMMENTS
I have interviewed and examined the patient and reviewed the residents note including the history, exam, assessment, and plan.  I agree with the residents assessment and plan.    32y male w/ pmhx/ochx of none consulted for preseptal cellulitis. Found to have likely preseptal cellulitis and dacryocystitis of the right side  # Preseptal cellulitis with dacryocystitis, right side  - Afebrile, WBC 17, CRP 60  - Vision intact, no sign of optic nerve dysfunction  - 3+ periorbital edema and 2+ erythema with tenderness to palpation OD; no proptosis, soft lid edema, no RTR, no discharge expressed from punctum, nodule at medial meatus exquisitely tender to palpation  - Grossly full OD possibly 10-20% restriction on abduction OD; full OS  - Posterior segment exam shows no acute abnormalities  - CT orbits with chronic sinusitis, left preseptal cellulitis with no abscess collection  - Recommend IV antibiotics given failure of outpatient antibiotics (Cefdinir and levaquin), consider ID consult  - Recommend ENT consult  - Recommend MRSA swab  - Recommend Maxitrol drops QID right eye  - Recommend flonase  - Warm compress to the area every hour  - Findings and plan discussed with patient and primary team  - will monitor closely  -case discussed with Dr. Martinez, ophthalmic plan per Dr. Juan Hidalgo MD

## 2022-10-24 NOTE — H&P ADULT - HISTORY OF PRESENT ILLNESS
Pt is a 33 yo man with nasal septum defect and recurrent maxillary sinusitis who presents for swelling of his R eye. He has seen ENT as early as 2016 who identified a severe nasal septal defect and recommended surgery. He has not had any surgery. Since then he has had multiple bouts of sinusitis. Since April, he has had R eye tearing and discharge, usually clear but sometimes yellow. He has had trials of different combinations of ophthalmic solutions of tobramycin, ofloxacin, amoxicillin, and prednisolone as well as azithromycin, which seemed to help but not resolve the issue. In early October, he started taking cefdinir and methylprednisolone. On Oct 22, started erythromycin ointment and levofloxacin. Over the past few days, the eye swelling and redness has gotten worse. Today, the eye completely shut so he came to the emergency room. It hurt this morning but not anymore and does not feel warm to the touch. His eye does not hurt to move. Denies fever, sweats, chills, loss of vision (besides eye being shut), CP, SOB, n/v/d, headache.

## 2022-10-24 NOTE — H&P ADULT - PROBLEM SELECTOR PLAN 1
CT orbit: orbital preseptal cellulitis. No abscess collection. No underlying acute sinusitis  - ophtho consulted, appreciate recs  - maxitrol 1 drop qid  - warm compress to area TID

## 2022-10-24 NOTE — ED PROVIDER NOTE - CLINICAL SUMMARY MEDICAL DECISION MAKING FREE TEXT BOX
32M with no significant PMH presents to the ED for right upper eyelid swelling, tearing for 4 days. Patient has a history of recurrent eye infection treated with antibiotics. Currently on Levaquin. VS wnl. PE Swelling of skin, upper/lower eyelid of right eye. Excessive tearing noted. Pupils is normally reactive, visualized conjunctiva is normal, EOM movement intact, VA 20/20. Opthalmology on board. Likely Pre-septal cellulitis. Plan for labs, CT-orbits with IVC, will give anlagesia, observe.

## 2022-10-24 NOTE — CONSULT NOTE ADULT - SUBJECTIVE AND OBJECTIVE BOX
HPI: 33yo M PMH CRS followed by Dr. Rosas p/w R preseptal cellulitis x 4 days refractory to outpatient management. Similar episode in April s/p tobramycin, Zylet, oflox, and amoxicillin, July s/p Bactrim, neomycin, Sept s/p cefdinir x7d, medrol dosepak, recently s/p levaquin 10/22 with no significant improvement.   Denies fevers, chills, night sweats.     Social History: denies etoh/tobacco    Review of Systems:  Constitutional: No fever, chills  Eyes: No blurry vision, erythema, discharge, double vision  Neuro: No tremors  Cardiovascular: No chest pain, palpitations  Respiratory: No SOB, no cough  GI: No nausea, vomiting, abdominal pain  Skin: no rash    Vital Signs Last 24 Hrs  T(C): 36.7 (24 Oct 2022 18:02), Max: 36.9 (24 Oct 2022 09:20)  T(F): 98.1 (24 Oct 2022 18:02), Max: 98.5 (24 Oct 2022 09:20)  HR: 99 (24 Oct 2022 18:02) (99 - 118)  BP: 129/78 (24 Oct 2022 18:02) (129/78 - 161/90)  BP(mean): --  RR: 18 (24 Oct 2022 18:02) (16 - 18)  SpO2: 99% (24 Oct 2022 18:02) (99% - 100%)    Parameters below as of 24 Oct 2022 18:02  Patient On (Oxygen Delivery Method): room air    Labs/Imagin.0   17.75 )-----------( 224      ( 24 Oct 2022 10:52 )             42.5   10-24    137  |  99  |  9   ----------------------------<  107<H>  3.9   |  28  |  0.74    Ca    9.7      24 Oct 2022 10:52    TPro  7.9  /  Alb  4.3  /  TBili  0.4  /  DBili  x   /  AST  21  /  ALT  23  /  AlkPhos  75  10-24      IMPRESSION:  1.  Right-to-left nasal septal deviation and leftward directed septal   spur as compromise left nasal cavity  2.  Carlota cells compromise each ostiomeatal complex, left larger than   right  3. Sporadic pattern right-sided chronic sinusitis  4. Left orbital preseptal cellulitis. No abscess collection. No   underlying acute sinusitis   HPI: 33yo M PMH CRS followed by Dr. Rosas p/w R preseptal cellulitis x 4 days refractory to outpatient management. Similar episode in April s/p tobramycin, Zylet, oflox, and amoxicillin, July s/p Bactrim, neomycin, Sept s/p cefdinir x7d, medrol dosepak, recently s/p levaquin 10/22 with no significant improvement. Denies nasal drainage, congestion, obstruction, facial pressure/pain. Reports intact vision, no tenderness with eye movement.    Denies fevers, chills, night sweats.     Social History: denies etoh/tobacco    Review of Systems:  Constitutional: No fever, chills  Eyes: No blurry vision, erythema, discharge, double vision  Neuro: No tremors  Cardiovascular: No chest pain, palpitations  Respiratory: No SOB, no cough  GI: No nausea, vomiting, abdominal pain  Skin: no rash    Vital Signs Last 24 Hrs  T(C): 36.7 (24 Oct 2022 18:02), Max: 36.9 (24 Oct 2022 09:20)  T(F): 98.1 (24 Oct 2022 18:02), Max: 98.5 (24 Oct 2022 09:20)  HR: 99 (24 Oct 2022 18:02) (99 - 118)  BP: 129/78 (24 Oct 2022 18:02) (129/78 - 161/90)  BP(mean): --  RR: 18 (24 Oct 2022 18:02) (16 - 18)  SpO2: 99% (24 Oct 2022 18:02) (99% - 100%)    Parameters below as of 24 Oct 2022 18:02  Patient On (Oxygen Delivery Method): room air      NAD, sitting upright in stretcher  Breathing comf on RA  Tolerating secretions  No stridor or stertor  Face: significant R periorbital edema, erythema, unable to voluntarily open R eye. 1-2mm eye opening with digital manipulation. L eye normal. Orbits are soft bilaterally. no tenderness with extraocular muscle movement. No blurry/double vision.   Nose: clear, no purulent drainage bilaterally  Mouth: clear  Neck: soft/flat     Labs/Imagin.0   17.75 )-----------( 224      ( 24 Oct 2022 10:52 )             42.5   10-    137  |  99  |  9   ----------------------------<  107<H>  3.9   |  28  |  0.74    Ca    9.7      24 Oct 2022 10:52    TPro  7.9  /  Alb  4.3  /  TBili  0.4  /  DBili  x   /  AST  21  /  ALT  23  /  AlkPhos  75  10-24      IMPRESSION:  1.  Right-to-left nasal septal deviation and leftward directed septal   spur as compromise left nasal cavity  2.  Carlota cells compromise each ostiomeatal complex, left larger than   right  3. Sporadic pattern right-sided chronic sinusitis  4. Left orbital preseptal cellulitis. No abscess collection. No   underlying acute sinusitis

## 2022-10-24 NOTE — ED PROVIDER NOTE - OBJECTIVE STATEMENT
32M with no significant PMH presents to the ED for right upper eyelid swelling for 4 days. Reports that in April he started having tearing and light yellow discharge from the eyes, was put on tobramycin, Zylet, oflox, and amoxicillin with resolution, but return of symptoms in July. Was started on Bactrim, neomycin, resolved and restarted in Sept. Was given prednisolone drops with metonidazole but did not start. Tried 7 days of cefdinir last week from ENT and medrol dose pack with no benefit. Lately started Levaquin since the 22nd with no significant improvement. Patient was sent to ED by outside physician for concern of preseptal cellulitis and possible dacryocystitis.

## 2022-10-24 NOTE — H&P ADULT - ASSESSMENT
Pt is a 33 yo man with nasal septum defect and recurrent maxillary sinusitis who presents for swelling of his R eye. Given his failure of several antibiotic trials and nasal septal defect, likely preorbital cellulitis in the setting of maxillary sinusitis.

## 2022-10-25 DIAGNOSIS — H04.301 UNSPECIFIED DACRYOCYSTITIS OF RIGHT LACRIMAL PASSAGE: ICD-10-CM

## 2022-10-25 DIAGNOSIS — L03.90 CELLULITIS, UNSPECIFIED: ICD-10-CM

## 2022-10-25 DIAGNOSIS — D72.829 ELEVATED WHITE BLOOD CELL COUNT, UNSPECIFIED: ICD-10-CM

## 2022-10-25 DIAGNOSIS — L03.213 PERIORBITAL CELLULITIS: ICD-10-CM

## 2022-10-25 LAB
ALBUMIN SERPL ELPH-MCNC: 4 G/DL — SIGNIFICANT CHANGE UP (ref 3.3–5)
ALP SERPL-CCNC: 69 U/L — SIGNIFICANT CHANGE UP (ref 40–120)
ALT FLD-CCNC: 36 U/L — SIGNIFICANT CHANGE UP (ref 4–41)
ANION GAP SERPL CALC-SCNC: 11 MMOL/L — SIGNIFICANT CHANGE UP (ref 7–14)
APTT BLD: 35 SEC — SIGNIFICANT CHANGE UP (ref 27–36.3)
AST SERPL-CCNC: 30 U/L — SIGNIFICANT CHANGE UP (ref 4–40)
BILIRUB SERPL-MCNC: 0.5 MG/DL — SIGNIFICANT CHANGE UP (ref 0.2–1.2)
BUN SERPL-MCNC: 9 MG/DL — SIGNIFICANT CHANGE UP (ref 7–23)
CALCIUM SERPL-MCNC: 9.2 MG/DL — SIGNIFICANT CHANGE UP (ref 8.4–10.5)
CHLORIDE SERPL-SCNC: 102 MMOL/L — SIGNIFICANT CHANGE UP (ref 98–107)
CO2 SERPL-SCNC: 26 MMOL/L — SIGNIFICANT CHANGE UP (ref 22–31)
CREAT SERPL-MCNC: 0.77 MG/DL — SIGNIFICANT CHANGE UP (ref 0.5–1.3)
EGFR: 122 ML/MIN/1.73M2 — SIGNIFICANT CHANGE UP
GLUCOSE SERPL-MCNC: 107 MG/DL — HIGH (ref 70–99)
HCT VFR BLD CALC: 39.9 % — SIGNIFICANT CHANGE UP (ref 39–50)
HGB BLD-MCNC: 13.2 G/DL — SIGNIFICANT CHANGE UP (ref 13–17)
INR BLD: 1.25 RATIO — HIGH (ref 0.88–1.16)
MAGNESIUM SERPL-MCNC: 1.8 MG/DL — SIGNIFICANT CHANGE UP (ref 1.6–2.6)
MCHC RBC-ENTMCNC: 31.1 PG — SIGNIFICANT CHANGE UP (ref 27–34)
MCHC RBC-ENTMCNC: 33.1 GM/DL — SIGNIFICANT CHANGE UP (ref 32–36)
MCV RBC AUTO: 94.1 FL — SIGNIFICANT CHANGE UP (ref 80–100)
MRSA PCR RESULT.: SIGNIFICANT CHANGE UP
NRBC # BLD: 0 /100 WBCS — SIGNIFICANT CHANGE UP (ref 0–0)
NRBC # FLD: 0 K/UL — SIGNIFICANT CHANGE UP (ref 0–0)
PHOSPHATE SERPL-MCNC: 3.4 MG/DL — SIGNIFICANT CHANGE UP (ref 2.5–4.5)
PLATELET # BLD AUTO: 218 K/UL — SIGNIFICANT CHANGE UP (ref 150–400)
POTASSIUM SERPL-MCNC: 3.6 MMOL/L — SIGNIFICANT CHANGE UP (ref 3.5–5.3)
POTASSIUM SERPL-SCNC: 3.6 MMOL/L — SIGNIFICANT CHANGE UP (ref 3.5–5.3)
PROT SERPL-MCNC: 7.2 G/DL — SIGNIFICANT CHANGE UP (ref 6–8.3)
PROTHROM AB SERPL-ACNC: 14.5 SEC — HIGH (ref 10.5–13.4)
RBC # BLD: 4.24 M/UL — SIGNIFICANT CHANGE UP (ref 4.2–5.8)
RBC # FLD: 11.9 % — SIGNIFICANT CHANGE UP (ref 10.3–14.5)
S AUREUS DNA NOSE QL NAA+PROBE: SIGNIFICANT CHANGE UP
SODIUM SERPL-SCNC: 139 MMOL/L — SIGNIFICANT CHANGE UP (ref 135–145)
WBC # BLD: 16.16 K/UL — HIGH (ref 3.8–10.5)
WBC # FLD AUTO: 16.16 K/UL — HIGH (ref 3.8–10.5)

## 2022-10-25 PROCEDURE — 68420 I&D LACRIMAL SAC: CPT | Mod: RT

## 2022-10-25 PROCEDURE — 99222 1ST HOSP IP/OBS MODERATE 55: CPT

## 2022-10-25 PROCEDURE — 99232 SBSQ HOSP IP/OBS MODERATE 35: CPT | Mod: GC

## 2022-10-25 RX ORDER — LIDOCAINE HYDROCHLORIDE AND EPINEPHRINE 10; 10 MG/ML; UG/ML
10 INJECTION, SOLUTION INFILTRATION; PERINEURAL ONCE
Refills: 0 | Status: DISCONTINUED | OUTPATIENT
Start: 2022-10-25 | End: 2022-10-27

## 2022-10-25 RX ORDER — LIDOCAINE HYDROCHLORIDE AND EPINEPHRINE 10; 10 MG/ML; UG/ML
100 INJECTION, SOLUTION INFILTRATION; PERINEURAL ONCE
Refills: 0 | Status: DISCONTINUED | OUTPATIENT
Start: 2022-10-25 | End: 2022-10-25

## 2022-10-25 RX ORDER — ACETAMINOPHEN 500 MG
650 TABLET ORAL EVERY 6 HOURS
Refills: 0 | Status: DISCONTINUED | OUTPATIENT
Start: 2022-10-25 | End: 2022-10-27

## 2022-10-25 RX ORDER — VANCOMYCIN HCL 1 G
1250 VIAL (EA) INTRAVENOUS EVERY 12 HOURS
Refills: 0 | Status: DISCONTINUED | OUTPATIENT
Start: 2022-10-25 | End: 2022-10-27

## 2022-10-25 RX ORDER — ACETAMINOPHEN 500 MG
1000 TABLET ORAL ONCE
Refills: 0 | Status: COMPLETED | OUTPATIENT
Start: 2022-10-25 | End: 2022-10-25

## 2022-10-25 RX ORDER — LANOLIN ALCOHOL/MO/W.PET/CERES
3 CREAM (GRAM) TOPICAL AT BEDTIME
Refills: 0 | Status: DISCONTINUED | OUTPATIENT
Start: 2022-10-25 | End: 2022-10-27

## 2022-10-25 RX ORDER — VANCOMYCIN HCL 1 G
1.25 VIAL (EA) INTRAVENOUS EVERY 12 HOURS
Refills: 0 | Status: DISCONTINUED | OUTPATIENT
Start: 2022-10-25 | End: 2022-10-25

## 2022-10-25 RX ADMIN — OXYMETAZOLINE HYDROCHLORIDE 1 SPRAY(S): 0.5 SPRAY NASAL at 17:52

## 2022-10-25 RX ADMIN — Medication 1000 MILLIGRAM(S): at 18:43

## 2022-10-25 RX ADMIN — OXYMETAZOLINE HYDROCHLORIDE 1 SPRAY(S): 0.5 SPRAY NASAL at 06:38

## 2022-10-25 RX ADMIN — Medication 1 DROP(S): at 11:04

## 2022-10-25 RX ADMIN — Medication 1 DROP(S): at 01:02

## 2022-10-25 RX ADMIN — Medication 1 DROP(S): at 23:34

## 2022-10-25 RX ADMIN — Medication 250 MILLIGRAM(S): at 12:32

## 2022-10-25 RX ADMIN — Medication 1 SPRAY(S): at 06:38

## 2022-10-25 RX ADMIN — Medication 400 MILLIGRAM(S): at 18:31

## 2022-10-25 RX ADMIN — Medication 1 DROP(S): at 06:39

## 2022-10-25 RX ADMIN — ENOXAPARIN SODIUM 40 MILLIGRAM(S): 100 INJECTION SUBCUTANEOUS at 06:39

## 2022-10-25 RX ADMIN — AMPICILLIN SODIUM AND SULBACTAM SODIUM 200 GRAM(S): 250; 125 INJECTION, POWDER, FOR SUSPENSION INTRAMUSCULAR; INTRAVENOUS at 17:50

## 2022-10-25 RX ADMIN — AMPICILLIN SODIUM AND SULBACTAM SODIUM 200 GRAM(S): 250; 125 INJECTION, POWDER, FOR SUSPENSION INTRAMUSCULAR; INTRAVENOUS at 23:33

## 2022-10-25 RX ADMIN — AMPICILLIN SODIUM AND SULBACTAM SODIUM 200 GRAM(S): 250; 125 INJECTION, POWDER, FOR SUSPENSION INTRAMUSCULAR; INTRAVENOUS at 11:04

## 2022-10-25 RX ADMIN — AMPICILLIN SODIUM AND SULBACTAM SODIUM 200 GRAM(S): 250; 125 INJECTION, POWDER, FOR SUSPENSION INTRAMUSCULAR; INTRAVENOUS at 05:06

## 2022-10-25 RX ADMIN — Medication 1 SPRAY(S): at 17:51

## 2022-10-25 RX ADMIN — Medication 250 MILLIGRAM(S): at 01:01

## 2022-10-25 NOTE — CONSULT NOTE ADULT - PROBLEM SELECTOR RECOMMENDATION 9
-failed outpt PO abx   -increase vanco 1.25 gm iv q12 + unasyn 3 gm iv q6 to cover strep, staph, possible GNR   -no sinusitis on CT  -await ophthal input    -f/u blood cx  -monitor wbc, temps  -monitor vanco trough, creatinine

## 2022-10-25 NOTE — CONSULT NOTE ADULT - ASSESSMENT
33 yo man with nasal septum defect and recurrent maxillary sinusitis who presents for swelling of his R eye with right periorbital cellulitis, dacrocystitis,  leukocytosis, sepsis     Howard Khan  Attending Physician   Division of Infectious Disease  Office #[phone]  Available on [employer] Teams also  After 5pm/weekend or no response, call #[phone]

## 2022-10-25 NOTE — PROGRESS NOTE PEDS - SUBJECTIVE AND OBJECTIVE BOX
S: right eye swelling s/p I&D    O:  Vital Signs Last 24 Hrs  T(C): 36.7 (25 Oct 2022 21:52), Max: 37.2 (25 Oct 2022 13:42)  T(F): 98.1 (25 Oct 2022 21:52), Max: 98.9 (25 Oct 2022 13:42)  HR: 96 (25 Oct 2022 21:52) (90 - 96)  BP: 118/76 (25 Oct 2022 21:52) (117/70 - 135/89)  BP(mean): --  RR: 18 (25 Oct 2022 21:52) (18 - 18)  SpO2: 100% (25 Oct 2022 21:52) (98% - 100%)    Eyes: OD: upper lid edema, lower medial lid with edema and pus from incision site  Ears: au tmi, eac clear  Nose: clear b/l   OC/OP: fom/bot soft, uvula midline  Neck: wnl      A/P:  32y male right sided dacryocystitis, s/p I&D by ophthalmology.           -continue afrin then saline nasal spray          -care as per optho/ID/medicine           -will sign off, please consults with additional questions.

## 2022-10-25 NOTE — PROGRESS NOTE ADULT - ASSESSMENT
Pt is a 31 yo man with nasal septum defect and recurrent maxillary sinusitis who presents for swelling of his R eye. Given his failure of several antibiotic trials and nasal septal defect, likely preorbital cellulitis in the setting of maxillary sinusitis.

## 2022-10-25 NOTE — PROCEDURE NOTE - ADDITIONAL PROCEDURE DETAILS
Written consent was obtained prior to procedure for incision & drainage of lacrimal sac abscess, right side.  Local anesthesia was given with lidocaine with epinephrine 1:200,000.  Site was prepped in sterile fashion with povidone iodine.  Single incision was made with #11 scalpel blade with immediate drainage of purulent drainage and bloody fluid.  Additional pus was drained using manual pressure with gauze, and saline rinse with large bore catheter.  Patient tolerated the procedure well  Erythromycin ointment was applied to the open incision.

## 2022-10-25 NOTE — PROGRESS NOTE ADULT - SUBJECTIVE AND OBJECTIVE BOX
Internal Medicine Progress Note    Patient is a 32y old  Male who presents with a chief complaint of eye swelling (25 Oct 2022 06:59)    OVERNIGHT EVENTS/SUBJECTIVE:    OBJECTIVE:  Vital Signs Last 24 Hrs  T(C): 36.5 (25 Oct 2022 06:38), Max: 36.9 (24 Oct 2022 09:20)  T(F): 97.7 (25 Oct 2022 06:38), Max: 98.5 (24 Oct 2022 09:20)  HR: 90 (25 Oct 2022 06:38) (90 - 118)  BP: 117/70 (25 Oct 2022 06:38) (117/70 - 161/90)  BP(mean): --  RR: 18 (25 Oct 2022 06:38) (16 - 18)  SpO2: 98% (25 Oct 2022 06:38) (98% - 100%)    Parameters below as of 25 Oct 2022 06:38  Patient On (Oxygen Delivery Method): room air      I&O's Detail    Daily Height in cm: 172.72 (24 Oct 2022 21:29)    Daily   Physical Exam:  General: NAD, resting comfortably in bed  Neuro: A&Ox4, 5/5 strength in all ext  HEENT: NC/AT, EOMI, normal hearing, oral mucosa moist, no oral lesions noted, no pharyngeal erythema, uvula midline  Neck: supple, thyroid not enlarged, no LAD  Resp: Breathing comfortably on RA, LCTA b/l  CV: Normal sinus rhythm, S1 and S2, no r/m/g  Abd: soft, non-distended, non-tender. No HSM.  Ext: ROMIx4, no edema, +2 pulses bilaterally  Skin: Warm and dry, no rashes or discolorations  Psych: Appropriate affect    Medications:  MEDICATIONS  (STANDING):  ampicillin/sulbactam  IVPB      ampicillin/sulbactam  IVPB 3 Gram(s) IV Intermittent every 6 hours  dexamethasone/neomycin/polymyxin Suspension 1 Drop(s) Right EYE four times a day  enoxaparin Injectable 40 milliGRAM(s) SubCutaneous every 24 hours  fluticasone propionate 50 MICROgram(s)/spray Nasal Spray 1 Spray(s) Both Nostrils two times a day  oxymetazoline 0.05% Nasal Spray 1 Spray(s) Both Nostrils two times a day  vancomycin  IVPB 1000 milliGRAM(s) IV Intermittent every 12 hours    MEDICATIONS  (PRN):      Labs:                        14.0   17.75 )-----------( 224      ( 24 Oct 2022 10:52 )             42.5     10-24    137  |  99  |  9   ----------------------------<  107<H>  3.9   |  28  |  0.74    Ca    9.7      24 Oct 2022 10:52    TPro  7.9  /  Alb  4.3  /  TBili  0.4  /  DBili  x   /  AST  21  /  ALT  23  /  AlkPhos  75  10-24        COVID-19 PCR: [facility] (24 Oct 2022 11:02)      Radiology: Internal Medicine Progress Note    Patient is a 32y old  Male who presents with a chief complaint of eye swelling (25 Oct 2022 06:59)    OVERNIGHT EVENTS/SUBJECTIVE: No overnight events. Pt reports feeling about the same on his R eye/side of face but feels like there may be some new swelling near his L eye. Denies increased drainage, fever, ha, cp, palps, sob, abd pain, n/v/d.     OBJECTIVE:  Vital Signs Last 24 Hrs  T(C): 36.5 (25 Oct 2022 06:38), Max: 36.9 (24 Oct 2022 09:20)  T(F): 97.7 (25 Oct 2022 06:38), Max: 98.5 (24 Oct 2022 09:20)  HR: 90 (25 Oct 2022 06:38) (90 - 118)  BP: 117/70 (25 Oct 2022 06:38) (117/70 - 161/90)  BP(mean): --  RR: 18 (25 Oct 2022 06:38) (16 - 18)  SpO2: 98% (25 Oct 2022 06:38) (98% - 100%)    Parameters below as of 25 Oct 2022 06:38  Patient On (Oxygen Delivery Method): room air      I&O's Detail    Daily Height in cm: 172.72 (24 Oct 2022 21:29)    Daily   Physical Exam:  General: NAD, resting comfortably in bed  Neuro: A&Ox4, 5/5 strength in all ext  HEENT: NC/AT, R eye swollen shit with crust at opening of eyelid, tense red edematoud periorbital area, nontender to palpation. Small area of edema under L eye over maxilla. EOMI, normal hearing, oral mucosa moist, no oral lesions noted, no pharyngeal erythema, uvula midline  Resp: Breathing comfortably on RA, LCTA b/l  CV: Normal sinus rhythm, S1 and S2, no r/m/g  Abd: soft, non-distended, non-tender. No HSM.  Ext: no edema, +2 pulses bilaterally  Skin: Warm and dry, no rashes or discolorations  Psych: Appropriate affect    Medications:  MEDICATIONS  (STANDING):  ampicillin/sulbactam  IVPB      ampicillin/sulbactam  IVPB 3 Gram(s) IV Intermittent every 6 hours  dexamethasone/neomycin/polymyxin Suspension 1 Drop(s) Right EYE four times a day  enoxaparin Injectable 40 milliGRAM(s) SubCutaneous every 24 hours  fluticasone propionate 50 MICROgram(s)/spray Nasal Spray 1 Spray(s) Both Nostrils two times a day  oxymetazoline 0.05% Nasal Spray 1 Spray(s) Both Nostrils two times a day  vancomycin  IVPB 1000 milliGRAM(s) IV Intermittent every 12 hours    MEDICATIONS  (PRN):      Labs:                        14.0   17.75 )-----------( 224      ( 24 Oct 2022 10:52 )             42.5     10-24    137  |  99  |  9   ----------------------------<  107<H>  3.9   |  28  |  0.74    Ca    9.7      24 Oct 2022 10:52    TPro  7.9  /  Alb  4.3  /  TBili  0.4  /  DBili  x   /  AST  21  /  ALT  23  /  AlkPhos  75  10-24        COVID-19 PCR: [facility] (24 Oct 2022 11:02)      Radiology:

## 2022-10-25 NOTE — PROGRESS NOTE ADULT - SUBJECTIVE AND OBJECTIVE BOX
HPI: 31yo M PMH CRS followed by Dr. Rosas p/w R preseptal cellulitis x 4 days refractory to outpatient management. Similar episode in April s/p tobramycin, Zylet, oflox, and amoxicillin, July s/p Bactrim, neomycin, Sept s/p cefdinir x7d, medrol dosepak, recently s/p levaquin 10/22 with no significant improvement. Denies nasal drainage, congestion, obstruction, facial pressure/pain. Reports intact vision, no tenderness with eye movement.    Denies fevers, chills, night sweats.     Interval hx: stable eye exam, no significant improvement, unable to voluntarily open right eye    Vital Signs Last 24 Hrs  T(C): 36.7 (24 Oct 2022 21:29), Max: 36.9 (24 Oct 2022 09:20)  T(F): 98.1 (24 Oct 2022 21:29), Max: 98.5 (24 Oct 2022 09:20)  HR: 104 (24 Oct 2022 21:29) (99 - 118)  BP: 125/81 (24 Oct 2022 21:29) (125/81 - 161/90)  BP(mean): --  RR: 18 (24 Oct 2022 21:29) (16 - 18)  SpO2: 100% (24 Oct 2022 21:29) (99% - 100%)    Parameters below as of 24 Oct 2022 21:29  Patient On (Oxygen Delivery Method): room air    NAD, lying in hospital bed  Breathing comf on RA  Tolerating secretions  No stridor or stertor  Face: significant R periorbital edema, erythema, unable to voluntarily open R eye. 1-2mm eye opening with digital manipulation. L eye normal. Orbits are soft bilaterally. no tenderness with extraocular muscle movement. No blurry/double vision.   Nose: clear, no purulent drainage bilaterally  Mouth: clear  Neck: soft/flat     Labs/imaging;     IMPRESSION:  1.  Right-to-left nasal septal deviation and leftward directed septal   spur as compromise left nasal cavity  2.  Carlota cells compromise each ostiomeatal complex, left larger than   right  3. Sporadic pattern right-sided chronic sinusitis  4. Left orbital preseptal cellulitis. No abscess collection. No   underlying acute sinusitis

## 2022-10-25 NOTE — PROGRESS NOTE ADULT - PROBLEM SELECTOR PLAN 2
CT scan: 1.  Right-to-left nasal septal deviation and leftward directed septal spur as compromise left nasal cavity 2.  Carlota cells compromise each ostiomeatal complex, left larger than right 3. Sporadic pattern right-sided chronic sinusitis.  - ENT consulted, appreciate recs  - flonase bid  - afrin bid x3d --> then, saline nasal spray bid x3 weeks   - unasyn 3g q6  - vanc 1g q12  - pending ID consult CT scan: 1.  Right-to-left nasal septal deviation and leftward directed septal spur as compromise left nasal cavity 2.  Carlota cells compromise each ostiomeatal complex, left larger than right 3. Sporadic pattern right-sided chronic sinusitis.  - ENT consulted, appreciate recs  - flonase bid  - afrin bid x3d --> then, saline nasal spray bid x3 weeks   - unasyn 3g q6  - vanc 1g q12  - ID consulted, rec increasing vanc to 1.25g bid and I&D of possible early abscess seen on CT

## 2022-10-25 NOTE — PROGRESS NOTE ADULT - PROBLEM SELECTOR PLAN 1
CT orbit: orbital preseptal cellulitis. No abscess collection. No underlying acute sinusitis  - ophtho consulted, appreciate recs  - maxitrol 1 drop qid  - warm compress to area TID CT orbit: orbital preseptal cellulitis. No abscess collection. No underlying acute sinusitis  - ophtho consulted, appreciate recs  - maxitrol 1 drop qid  - warm compress to area TID  - pedning I&D with optho

## 2022-10-25 NOTE — CONSULT NOTE ADULT - PROBLEM SELECTOR PROBLEM 1
----- Message from Aiden Koch MD sent at 2/2/2019  7:35 PM CST -----  Results are somewhat abnormal. Please keep regular follow up.   Sepsis due to cellulitis

## 2022-10-25 NOTE — CONSULT NOTE ADULT - SUBJECTIVE AND OBJECTIVE BOX
PANDA ROMANO 32y Male  MRN-[id number]    Patient is a 32y old  Male who presents with a chief complaint of eye swelling (25 Oct 2022 09:32)      HPI:  Pt is a 33 yo man with nasal septum defect and recurrent maxillary sinusitis who presents for swelling of his R eye. He has seen ENT as early as 2016 who identified a severe nasal septal defect and recommended surgery. He has not had any surgery. Since then he has had multiple bouts of sinusitis. Since April, he has had R eye tearing and discharge, usually clear but sometimes yellow. He has had trials of different combinations of ophthalmic solutions of tobramycin, ofloxacin, amoxicillin, and prednisolone as well as azithromycin, which seemed to help but not resolve the issue. In early October, he started taking cefdinir and methylprednisolone. On Oct 22, started erythromycin ointment and levofloxacin. Over the past few days, the eye swelling and redness has gotten worse. Today, the eye completely shut so he came to the emergency room. It hurt this morning but not anymore and does not feel warm to the touch. His eye does not hurt to move. Denies fever, sweats, chills, loss of vision (besides eye being shut), CP, SOB, n/v/d, headache. (24 Oct 2022 17:58)      PAST MEDICAL & SURGICAL HISTORY:  Deviated nasal septum      No significant past surgical history          Allergies    No Known Allergies    Intolerances        ANTIMICROBIALS:  ampicillin/sulbactam  IVPB    ampicillin/sulbactam  IVPB 3 every 6 hours  vancomycin  IVPB 1000 every 12 hours      MEDICATIONS  (STANDING):  ampicillin/sulbactam  IVPB      ampicillin/sulbactam  IVPB 3 Gram(s) IV Intermittent every 6 hours  dexamethasone/neomycin/polymyxin Suspension 1 Drop(s) Right EYE four times a day  enoxaparin Injectable 40 milliGRAM(s) SubCutaneous every 24 hours  fluticasone propionate 50 MICROgram(s)/spray Nasal Spray 1 Spray(s) Both Nostrils two times a day  oxymetazoline 0.05% Nasal Spray 1 Spray(s) Both Nostrils two times a day  vancomycin  IVPB 1000 milliGRAM(s) IV Intermittent every 12 hours      Social History  Smoking:  Etoh:  Drug use:      FAMILY HISTORY:  No pertinent family history in first degree relatives        Vital Signs Last 24 Hrs  T(C): 36.5 (25 Oct 2022 06:38), Max: 36.7 (24 Oct 2022 18:02)  T(F): 97.7 (25 Oct 2022 06:38), Max: 98.1 (24 Oct 2022 18:02)  HR: 90 (25 Oct 2022 06:38) (90 - 104)  BP: 117/70 (25 Oct 2022 06:38) (117/70 - 129/78)  BP(mean): --  RR: 18 (25 Oct 2022 06:38) (18 - 18)  SpO2: 98% (25 Oct 2022 06:38) (98% - 100%)    Parameters below as of 25 Oct 2022 06:38  Patient On (Oxygen Delivery Method): room air        CBC Full  -  ( 25 Oct 2022 06:00 )  WBC Count : 16.16 K/uL  RBC Count : 4.24 M/uL  Hemoglobin : 13.2 g/dL  Hematocrit : 39.9 %  Platelet Count - Automated : 218 K/uL  Mean Cell Volume : 94.1 fL  Mean Cell Hemoglobin : 31.1 pg  Mean Cell Hemoglobin Concentration : 33.1 gm/dL  Auto Neutrophil # : x  Auto Lymphocyte # : x  Auto Monocyte # : x  Auto Eosinophil # : x  Auto Basophil # : x  Auto Neutrophil % : x  Auto Lymphocyte % : x  Auto Monocyte % : x  Auto Eosinophil % : x  Auto Basophil % : x    10-25    139  |  102  |  9   ----------------------------<  107<H>  3.6   |  26  |  0.77    Ca    9.2      25 Oct 2022 06:00  Phos  3.4     10-25  Mg     1.80     10-25    TPro  7.2  /  Alb  4.0  /  TBili  0.5  /  DBili  x   /  AST  30  /  ALT  36  /  AlkPhos  69  10-25    LIVER FUNCTIONS - ( 25 Oct 2022 06:00 )  Alb: 4.0 g/dL / Pro: 7.2 g/dL / ALK PHOS: 69 U/L / ALT: 36 U/L / AST: 30 U/L / GGT: x               MICROBIOLOGY:        RADIOLOGY  < from: CT Orbit w/ IV Cont (10.24.22 @ 14:31) >  1.  Right-to-left nasal septal deviation and leftward directed septal   spur as compromise left nasal cavity    2.  Carlota cells compromise each ostiomeatal complex, left larger than   right    3. Sporadic pattern right-sided chronic sinusitis    4. Left orbital preseptal cellulitis. No abscess collection. No   underlying acute sinusitis    < end of copied text >   PANDA ROMANO 32y Male  [id number]    Patient is a 32y old  Male who presents with a chief complaint of eye swelling (25 Oct 2022 09:32)      HPI:  Pt is a 33 yo man with nasal septum defect and recurrent maxillary sinusitis who presents for swelling of his R eye. He has seen ENT as early as 2016 who identified a severe nasal septal defect and recommended surgery. He has not had any surgery. Since then he has had multiple bouts of sinusitis. Since April, he has had R eye tearing and discharge, usually clear but sometimes yellow. He has had trials of different combinations of ophthalmic solutions of tobramycin, ofloxacin, amoxicillin, and prednisolone as well as azithromycin, which seemed to help but not resolve the issue. In early October, he started taking cefdinir and methylprednisolone. On Oct 22, started erythromycin ointment and levofloxacin. Over the past few days, the eye swelling and redness has gotten worse. Today, the eye completely shut so he came to the emergency room. It hurt this morning but not anymore and does not feel warm to the touch. His eye does not hurt to move. Denies fever, sweats, chills, loss of vision (besides eye being shut), CP, SOB, n/v/d, headache. (24 Oct 2022 17:58)      PAST MEDICAL & SURGICAL HISTORY:  Deviated nasal septum      No significant past surgical history          Allergies    No Known Allergies    Intolerances        ANTIMICROBIALS:  ampicillin/sulbactam  IVPB    ampicillin/sulbactam  IVPB 3 every 6 hours  vancomycin  IVPB 1000 every 12 hours      MEDICATIONS  (STANDING):  ampicillin/sulbactam  IVPB      ampicillin/sulbactam  IVPB 3 Gram(s) IV Intermittent every 6 hours  dexamethasone/neomycin/polymyxin Suspension 1 Drop(s) Right EYE four times a day  enoxaparin Injectable 40 milliGRAM(s) SubCutaneous every 24 hours  fluticasone propionate 50 MICROgram(s)/spray Nasal Spray 1 Spray(s) Both Nostrils two times a day  oxymetazoline 0.05% Nasal Spray 1 Spray(s) Both Nostrils two times a day  vancomycin  IVPB 1000 milliGRAM(s) IV Intermittent every 12 hours      Social History  Smoking: no  Etoh: no  Drug use: no      FAMILY HISTORY:  No pertinent family history in first degree relatives  No h/o cellulitis         Vital Signs Last 24 Hrs  T(C): 36.5 (25 Oct 2022 06:38), Max: 36.7 (24 Oct 2022 18:02)  T(F): 97.7 (25 Oct 2022 06:38), Max: 98.1 (24 Oct 2022 18:02)  HR: 90 (25 Oct 2022 06:38) (90 - 104)  BP: 117/70 (25 Oct 2022 06:38) (117/70 - 129/78)  BP(mean): --  RR: 18 (25 Oct 2022 06:38) (18 - 18)  SpO2: 98% (25 Oct 2022 06:38) (98% - 100%)    Parameters below as of 25 Oct 2022 06:38  Patient On (Oxygen Delivery Method): room air        CBC Full  -  ( 25 Oct 2022 06:00 )  WBC Count : 16.16 K/uL  RBC Count : 4.24 M/uL  Hemoglobin : 13.2 g/dL  Hematocrit : 39.9 %  Platelet Count - Automated : 218 K/uL  Mean Cell Volume : 94.1 fL  Mean Cell Hemoglobin : 31.1 pg  Mean Cell Hemoglobin Concentration : 33.1 gm/dL  Auto Neutrophil # : x  Auto Lymphocyte # : x  Auto Monocyte # : x  Auto Eosinophil # : x  Auto Basophil # : x  Auto Neutrophil % : x  Auto Lymphocyte % : x  Auto Monocyte % : x  Auto Eosinophil % : x  Auto Basophil % : x    10-25    139  |  102  |  9   ----------------------------<  107<H>  3.6   |  26  |  0.77    Ca    9.2      25 Oct 2022 06:00  Phos  3.4     10-25  Mg     1.80     10-25    TPro  7.2  /  Alb  4.0  /  TBili  0.5  /  DBili  x   /  AST  30  /  ALT  36  /  AlkPhos  69  10-25    LIVER FUNCTIONS - ( 25 Oct 2022 06:00 )  Alb: 4.0 g/dL / Pro: 7.2 g/dL / ALK PHOS: 69 U/L / ALT: 36 U/L / AST: 30 U/L / GGT: x               MICROBIOLOGY:        RADIOLOGY  < from: CT Orbit w/ IV Cont (10.24.22 @ 14:31) >  1.  Right-to-left nasal septal deviation and leftward directed septal   spur as compromise left nasal cavity    2.  Carlota cells compromise each ostiomeatal complex, left larger than   right    3. Sporadic pattern right-sided chronic sinusitis    4. Left orbital preseptal cellulitis. No abscess collection. No   underlying acute sinusitis    < end of copied text >

## 2022-10-25 NOTE — PROGRESS NOTE ADULT - ASSESSMENT
INCOMPLETE NOTE, FINAL RECS TO FOLLOW    Assessment and Recommendations:  32y male w/ pmhx/ochx of none consulted for preseptal cellulitis. Found to have likely preseptal cellulitis and dacryocystitis of the right side.    #   - Findings and plan discussed with patient and primary team.    Patient seen and discussed with  ***    Outpatient follow-up: Patient should follow-up with his/her ophthalmologist or with [facility] Department of Ophthalmology at the address below     [address] [place] [address]. [address]  [place] [address]  [phone]     32y male w/ pmhx/ochx of none consulted for preseptal cellulitis. Found to have likely preseptal cellulitis and dacryocystitis of the right side  # Preseptal cellulitis with likely dacryocystitis, right side  - Afebrile, WBC 17, CRP 60  - Vision intact, no sign of optic nerve dysfunction  - 3+ periorbital edema and 2+ erythema with tenderness to palpation OD; no proptosis, soft lid edema, no RTR, no discharge expressed from punctum, nodule at medial meatus exquisitely tender to palpation. Edema significantly improved today   - Grossly full OD possibly 10-20% restriction on abduction OD; full OS  - Posterior segment exam shows no acute abnormalities  - CT orbits with chronic sinusitis, left preseptal cellulitis with no abscess collection  - Continue IV antibiotics per ID  - Pending MRSA swab  - Continue Maxitrol drops QID right eye, flonase BID  - Possible I&D today  - Warm compress to the area TID  - Findings and plan discussed with patient and primary team.    Patient seen and discussed with Dr. Hidalgo; discussed with Dr Martinez, oculoplastics    Outpatient follow-up: Patient should follow-up with his/her ophthalmologist or with [facility] Department of Ophthalmology at the address below     [address] [place] [address]. [address]  [place] [address]  [phone]

## 2022-10-26 ENCOUNTER — TRANSCRIPTION ENCOUNTER (OUTPATIENT)
Age: 32
End: 2022-10-26

## 2022-10-26 LAB
ANION GAP SERPL CALC-SCNC: 12 MMOL/L — SIGNIFICANT CHANGE UP (ref 7–14)
BUN SERPL-MCNC: 11 MG/DL — SIGNIFICANT CHANGE UP (ref 7–23)
CALCIUM SERPL-MCNC: 9.3 MG/DL — SIGNIFICANT CHANGE UP (ref 8.4–10.5)
CHLORIDE SERPL-SCNC: 99 MMOL/L — SIGNIFICANT CHANGE UP (ref 98–107)
CO2 SERPL-SCNC: 26 MMOL/L — SIGNIFICANT CHANGE UP (ref 22–31)
CREAT SERPL-MCNC: 0.74 MG/DL — SIGNIFICANT CHANGE UP (ref 0.5–1.3)
EGFR: 123 ML/MIN/1.73M2 — SIGNIFICANT CHANGE UP
GLUCOSE SERPL-MCNC: 100 MG/DL — HIGH (ref 70–99)
HCT VFR BLD CALC: 40.6 % — SIGNIFICANT CHANGE UP (ref 39–50)
HGB BLD-MCNC: 13.1 G/DL — SIGNIFICANT CHANGE UP (ref 13–17)
MAGNESIUM SERPL-MCNC: 2.2 MG/DL — SIGNIFICANT CHANGE UP (ref 1.6–2.6)
MCHC RBC-ENTMCNC: 30.8 PG — SIGNIFICANT CHANGE UP (ref 27–34)
MCHC RBC-ENTMCNC: 32.3 GM/DL — SIGNIFICANT CHANGE UP (ref 32–36)
MCV RBC AUTO: 95.5 FL — SIGNIFICANT CHANGE UP (ref 80–100)
NRBC # BLD: 0 /100 WBCS — SIGNIFICANT CHANGE UP (ref 0–0)
NRBC # FLD: 0 K/UL — SIGNIFICANT CHANGE UP (ref 0–0)
PHOSPHATE SERPL-MCNC: 4.6 MG/DL — HIGH (ref 2.5–4.5)
PLATELET # BLD AUTO: 231 K/UL — SIGNIFICANT CHANGE UP (ref 150–400)
POTASSIUM SERPL-MCNC: 3.8 MMOL/L — SIGNIFICANT CHANGE UP (ref 3.5–5.3)
POTASSIUM SERPL-SCNC: 3.8 MMOL/L — SIGNIFICANT CHANGE UP (ref 3.5–5.3)
RBC # BLD: 4.25 M/UL — SIGNIFICANT CHANGE UP (ref 4.2–5.8)
RBC # FLD: 11.7 % — SIGNIFICANT CHANGE UP (ref 10.3–14.5)
SODIUM SERPL-SCNC: 137 MMOL/L — SIGNIFICANT CHANGE UP (ref 135–145)
VANCOMYCIN TROUGH SERPL-MCNC: 4 UG/ML — LOW (ref 10–20)
WBC # BLD: 13.08 K/UL — HIGH (ref 3.8–10.5)
WBC # FLD AUTO: 13.08 K/UL — HIGH (ref 3.8–10.5)

## 2022-10-26 PROCEDURE — 99232 SBSQ HOSP IP/OBS MODERATE 35: CPT | Mod: GC

## 2022-10-26 PROCEDURE — 99232 SBSQ HOSP IP/OBS MODERATE 35: CPT

## 2022-10-26 RX ADMIN — OXYMETAZOLINE HYDROCHLORIDE 1 SPRAY(S): 0.5 SPRAY NASAL at 22:36

## 2022-10-26 RX ADMIN — Medication 1 DROP(S): at 12:02

## 2022-10-26 RX ADMIN — OXYMETAZOLINE HYDROCHLORIDE 1 SPRAY(S): 0.5 SPRAY NASAL at 06:19

## 2022-10-26 RX ADMIN — Medication 650 MILLIGRAM(S): at 14:45

## 2022-10-26 RX ADMIN — AMPICILLIN SODIUM AND SULBACTAM SODIUM 200 GRAM(S): 250; 125 INJECTION, POWDER, FOR SUSPENSION INTRAMUSCULAR; INTRAVENOUS at 17:26

## 2022-10-26 RX ADMIN — Medication 166.67 MILLIGRAM(S): at 01:07

## 2022-10-26 RX ADMIN — Medication 1 SPRAY(S): at 06:18

## 2022-10-26 RX ADMIN — Medication 1 SPRAY(S): at 17:27

## 2022-10-26 RX ADMIN — AMPICILLIN SODIUM AND SULBACTAM SODIUM 200 GRAM(S): 250; 125 INJECTION, POWDER, FOR SUSPENSION INTRAMUSCULAR; INTRAVENOUS at 06:17

## 2022-10-26 RX ADMIN — ENOXAPARIN SODIUM 40 MILLIGRAM(S): 100 INJECTION SUBCUTANEOUS at 06:17

## 2022-10-26 RX ADMIN — Medication 166.67 MILLIGRAM(S): at 12:00

## 2022-10-26 RX ADMIN — Medication 1 DROP(S): at 06:18

## 2022-10-26 RX ADMIN — Medication 1 DROP(S): at 17:27

## 2022-10-26 RX ADMIN — Medication 650 MILLIGRAM(S): at 13:45

## 2022-10-26 RX ADMIN — AMPICILLIN SODIUM AND SULBACTAM SODIUM 200 GRAM(S): 250; 125 INJECTION, POWDER, FOR SUSPENSION INTRAMUSCULAR; INTRAVENOUS at 12:04

## 2022-10-26 RX ADMIN — AMPICILLIN SODIUM AND SULBACTAM SODIUM 200 GRAM(S): 250; 125 INJECTION, POWDER, FOR SUSPENSION INTRAMUSCULAR; INTRAVENOUS at 22:36

## 2022-10-26 RX ADMIN — Medication 3 MILLIGRAM(S): at 22:36

## 2022-10-26 NOTE — PROGRESS NOTE ADULT - ASSESSMENT
32y male w/ pmhx/ochx of none consulted for preseptal cellulitis. Found to have likely preseptal cellulitis and dacryocystitis of the right side  # Preseptal cellulitis with likely dacryocystitis, right side  - Afebrile, WBC 17, CRP 60  - Vision intact, no sign of optic nerve dysfunction  - 1+ periorbital edema lower>upper and 1+ erythema with tenderness to palpation OD; no proptosis, soft lid edema, no RTR, no discharge expressed from punctum, nodule at medial meatus exquisitely tender to palpation. Edema significantly improved today   - Grossly full OD possibly 10-20% restriction on abduction OD; full OS  - Posterior segment exam shows no acute abnormalities  - CT orbits with chronic sinusitis, left preseptal cellulitis with abscess collection in the lacrimal sac  - Continue IV antibiotics per ID  - MRSA swab negative  - Continue Maxitrol drops QID right eye, flonase BID  - s/p I&D on 10/25 with significant improvement   - Warm compress to the area TID  - Findings and plan discussed with patient and primary team.    Discussed with Dr Martinez, oculoplastics    Outpatient follow-up: Patient should follow-up with his/her ophthalmologist or with [facility] Department of Ophthalmology at the address below     [address] [place] [address]. [address]  [place] [address]  [phone]   32y male w/ pmhx/ochx of none consulted for preseptal cellulitis. Found to have likely preseptal cellulitis and dacryocystitis of the right side  # Preseptal cellulitis with likely dacryocystitis, right side  - Afebrile, WBC 17, CRP 60  - Vision intact, no sign of optic nerve dysfunction  - 1+ periorbital edema lower>upper and 1+ erythema with tenderness to palpation OD; no proptosis, soft lid edema, no RTR, no discharge expressed from punctum, nodule at medial meatus exquisitely tender to palpation. Edema significantly improved today   - Grossly full OD possibly 10-20% restriction on abduction OD; full OS  - Posterior segment exam shows no acute abnormalities  - CT orbits with chronic sinusitis, left preseptal cellulitis with abscess collection in the lacrimal sac  - Continue IV antibiotics per ID  - MRSA swab negative  - Continue Maxitrol drops QID right eye, flonase BID  - s/p I&D on 10/25 with significant improvement   - Warm compress to the area TID  - Findings and plan discussed with patient and primary team.    Seen & discussed with Dr Hidalgo; Discussed with Dr Martinez, oculoplastics    Outpatient follow-up: Patient should follow-up with his/her ophthalmologist or with [facility] Department of Ophthalmology at the address below     [address] [place] [address]. [address]  [place] [address]  [phone]

## 2022-10-26 NOTE — PROGRESS NOTE ADULT - SUBJECTIVE AND OBJECTIVE BOX
PANDA ROMANO 32y [id number]    Patient is a 32y old  Male who presents with a chief complaint of eye swelling (26 Oct 2022 09:22)      Follow Up/CC:  ID following for dacrocystitis     Interval History/ROS: no fever, s/p I+D    Allergies    No Known Allergies    Intolerances        ANTIMICROBIALS:  ampicillin/sulbactam  IVPB    ampicillin/sulbactam  IVPB 3 every 6 hours  vancomycin  IVPB 1250 every 12 hours      MEDICATIONS  (STANDING):  ampicillin/sulbactam  IVPB      ampicillin/sulbactam  IVPB 3 Gram(s) IV Intermittent every 6 hours  dexamethasone/neomycin/polymyxin Suspension 1 Drop(s) Right EYE four times a day  enoxaparin Injectable 40 milliGRAM(s) SubCutaneous every 24 hours  fluticasone propionate 50 MICROgram(s)/spray Nasal Spray 1 Spray(s) Both Nostrils two times a day  lidocaine 1%/epinephrine 1:200,000 Inj 10 milliLiter(s) Local Injection once  melatonin 3 milliGRAM(s) Oral at bedtime  oxymetazoline 0.05% Nasal Spray 1 Spray(s) Both Nostrils two times a day  vancomycin  IVPB 1250 milliGRAM(s) IV Intermittent every 12 hours    MEDICATIONS  (PRN):  acetaminophen     Tablet .. 650 milliGRAM(s) Oral every 6 hours PRN Mild Pain (1 - 3), Moderate Pain (4 - 6)        Vital Signs Last 24 Hrs  T(C): 37 (26 Oct 2022 06:16), Max: 37.2 (25 Oct 2022 13:42)  T(F): 98.6 (26 Oct 2022 06:16), Max: 98.9 (25 Oct 2022 13:42)  HR: 79 (26 Oct 2022 06:16) (79 - 96)  BP: 118/81 (26 Oct 2022 06:16) (118/76 - 135/89)  BP(mean): --  RR: 18 (26 Oct 2022 06:16) (18 - 18)  SpO2: 100% (26 Oct 2022 06:16) (100% - 100%)    Parameters below as of 26 Oct 2022 06:16  Patient On (Oxygen Delivery Method): room air        CBC Full  -  ( 26 Oct 2022 05:40 )  WBC Count : 13.08 K/uL  RBC Count : 4.25 M/uL  Hemoglobin : 13.1 g/dL  Hematocrit : 40.6 %  Platelet Count - Automated : 231 K/uL  Mean Cell Volume : 95.5 fL  Mean Cell Hemoglobin : 30.8 pg  Mean Cell Hemoglobin Concentration : 32.3 gm/dL  Auto Neutrophil # : x  Auto Lymphocyte # : x  Auto Monocyte # : x  Auto Eosinophil # : x  Auto Basophil # : x  Auto Neutrophil % : x  Auto Lymphocyte % : x  Auto Monocyte % : x  Auto Eosinophil % : x  Auto Basophil % : x    10-26    137  |  99  |  11  ----------------------------<  100<H>  3.8   |  26  |  0.74    Ca    9.3      26 Oct 2022 05:40  Phos  4.6     10-26  Mg     2.20     10-26    TPro  7.2  /  Alb  4.0  /  TBili  0.5  /  DBili  x   /  AST  30  /  ALT  36  /  AlkPhos  69  10-25    LIVER FUNCTIONS - ( 25 Oct 2022 06:00 )  Alb: 4.0 g/dL / Pro: 7.2 g/dL / ALK PHOS: 69 U/L / ALT: 36 U/L / AST: 30 U/L / GGT: x               MICROBIOLOGY:  .Blood Blood-Peripheral  10-24-22   No growth to date.  --  --      .Blood Blood-Peripheral  10-24-22   No growth to date.  --  --        Vancomycin Level, Trough: 4.0 ug/mL (10-26-22 @ 00:00)      RADIOLOGY    < from: CT Orbit w/ IV Cont (10.24.22 @ 14:31) >  1.  Right-to-left nasal septal deviation and leftward directed septal   spur as compromise left nasal cavity    2.  Carlota cells compromise each ostiomeatal complex, left larger than   right    3. Sporadic pattern right-sided chronic sinusitis    4. Left orbital preseptal cellulitis. No abscess collection. No   underlying acute sinusitis    < end of copied text >

## 2022-10-26 NOTE — PROGRESS NOTE ADULT - PROBLEM SELECTOR PLAN 2
CT scan: 1.  Right-to-left nasal septal deviation and leftward directed septal spur as compromise left nasal cavity 2.  Carlota cells compromise each ostiomeatal complex, left larger than right 3. Sporadic pattern right-sided chronic sinusitis.  - ENT consulted, appreciate recs  - flonase bid  - afrin bid x3d --> then, saline nasal spray bid x3 weeks   - unasyn 3g q6  - vanc 1g q12  - ID consulted, rec increasing vanc to 1.25g bid and I&D of possible early abscess seen on CT CT scan: 1.  Right-to-left nasal septal deviation and leftward directed septal spur as compromise left nasal cavity 2.  Carlota cells compromise each ostiomeatal complex, left larger than right 3. Sporadic pattern right-sided chronic sinusitis.  - ENT consulted, appreciate recs  - flonase bid  - afrin bid x3d --> then, saline nasal spray bid x3 weeks   - unasyn 3g q6  - vanc 1g q12  - ID consulted, appreciate recs

## 2022-10-26 NOTE — DISCHARGE NOTE PROVIDER - NSFOLLOWUPCLINICS_GEN_ALL_ED_FT
[facility]  Ophthalmology  [address], [address]  [place] [address]  Phone: [phone]  Fax:   Follow Up Time: 1 week    An ENT Doctor  Otolaryngology (ENT)  .  [place]   Phone:   Fax:

## 2022-10-26 NOTE — PROGRESS NOTE ADULT - SUBJECTIVE AND OBJECTIVE BOX
Internal Medicine Progress Note    Patient is a 32y old  Male who presents with a chief complaint of eye swelling (26 Oct 2022 07:01)    OVERNIGHT EVENTS/SUBJECTIVE:    OBJECTIVE:  Vital Signs Last 24 Hrs  T(C): 37 (26 Oct 2022 06:16), Max: 37.2 (25 Oct 2022 13:42)  T(F): 98.6 (26 Oct 2022 06:16), Max: 98.9 (25 Oct 2022 13:42)  HR: 79 (26 Oct 2022 06:16) (79 - 96)  BP: 118/81 (26 Oct 2022 06:16) (118/76 - 135/89)  BP(mean): --  RR: 18 (26 Oct 2022 06:16) (18 - 18)  SpO2: 100% (26 Oct 2022 06:16) (100% - 100%)    Parameters below as of 26 Oct 2022 06:16  Patient On (Oxygen Delivery Method): room air      I&O's Detail    Daily     Daily   Physical Exam:  General: NAD, resting comfortably in bed  Neuro: A&Ox4, 5/5 strength in all ext  HEENT: NC/AT, EOMI, normal hearing, oral mucosa moist, no oral lesions noted, no pharyngeal erythema, uvula midline  Neck: supple, thyroid not enlarged, no LAD  Resp: Breathing comfortably on RA, LCTA b/l  CV: Normal sinus rhythm, S1 and S2, no r/m/g  Abd: soft, non-distended, non-tender. No HSM.  Ext: ROMIx4, no edema, +2 pulses bilaterally  Skin: Warm and dry, no rashes or discolorations  Psych: Appropriate affect    Medications:  MEDICATIONS  (STANDING):  ampicillin/sulbactam  IVPB      ampicillin/sulbactam  IVPB 3 Gram(s) IV Intermittent every 6 hours  dexamethasone/neomycin/polymyxin Suspension 1 Drop(s) Right EYE four times a day  enoxaparin Injectable 40 milliGRAM(s) SubCutaneous every 24 hours  fluticasone propionate 50 MICROgram(s)/spray Nasal Spray 1 Spray(s) Both Nostrils two times a day  lidocaine 1%/epinephrine 1:200,000 Inj 10 milliLiter(s) Local Injection once  melatonin 3 milliGRAM(s) Oral at bedtime  oxymetazoline 0.05% Nasal Spray 1 Spray(s) Both Nostrils two times a day  vancomycin  IVPB 1250 milliGRAM(s) IV Intermittent every 12 hours    MEDICATIONS  (PRN):  acetaminophen     Tablet .. 650 milliGRAM(s) Oral every 6 hours PRN Mild Pain (1 - 3), Moderate Pain (4 - 6)      Labs:                        13.2   16.16 )-----------( 218      ( 25 Oct 2022 06:00 )             39.9     10-25    139  |  102  |  9   ----------------------------<  107<H>  3.6   |  26  |  0.77    Ca    9.2      25 Oct 2022 06:00  Phos  3.4     10-25  Mg     1.80     10-25    TPro  7.2  /  Alb  4.0  /  TBili  0.5  /  DBili  x   /  AST  30  /  ALT  36  /  AlkPhos  69  10-25    PT/INR - ( 25 Oct 2022 06:00 )   PT: 14.5 sec;   INR: 1.25 ratio         PTT - ( 25 Oct 2022 06:00 )  PTT:35.0 sec    COVID-19 PCR: NotDetec (24 Oct 2022 11:02)      Radiology: Internal Medicine Progress Note    Patient is a 32y old  Male who presents with a chief complaint of eye swelling (26 Oct 2022 07:01)    OVERNIGHT EVENTS/SUBJECTIVE: Pt had I&D yesterday with ophtho and thinks it helps. Tense feeling is gone, no fever, no pain. He says there is some discharge and he wipes it away. He thinks when the swelling goes down a little more he will be able to open his eye. No HA, sob, cp, palps, cough, abd pain, nvd    OBJECTIVE:  Vital Signs Last 24 Hrs  T(C): 37 (26 Oct 2022 06:16), Max: 37.2 (25 Oct 2022 13:42)  T(F): 98.6 (26 Oct 2022 06:16), Max: 98.9 (25 Oct 2022 13:42)  HR: 79 (26 Oct 2022 06:16) (79 - 96)  BP: 118/81 (26 Oct 2022 06:16) (118/76 - 135/89)  BP(mean): --  RR: 18 (26 Oct 2022 06:16) (18 - 18)  SpO2: 100% (26 Oct 2022 06:16) (100% - 100%)    Parameters below as of 26 Oct 2022 06:16  Patient On (Oxygen Delivery Method): room air      I&O's Detail    Daily     Daily   Physical Exam:  General: NAD, resting comfortably in bed  Neuro: A&Ox4, no gross deficits   HEENT: NC/AT, EOMI, R periorbital area with crusting lesion where incision done. No active drainage but some crust around area. Eye able to open slightly. Less tense to touch, no TTP. normal hearing, oral mucosa moist, no oral lesions noted, no pharyngeal erythema, uvula midline  Resp: Breathing comfortably on RA, LCTA b/l  CV: Normal sinus rhythm, S1 and S2, no r/m/g  Abd: soft, non-distended, non-tender. No HSM.  Ext: ROMIx4, no edema, +2 pulses bilaterally  Skin: Warm and dry, no rashes or discolorations  Psych: Appropriate affect    Medications:  MEDICATIONS  (STANDING):  ampicillin/sulbactam  IVPB      ampicillin/sulbactam  IVPB 3 Gram(s) IV Intermittent every 6 hours  dexamethasone/neomycin/polymyxin Suspension 1 Drop(s) Right EYE four times a day  enoxaparin Injectable 40 milliGRAM(s) SubCutaneous every 24 hours  fluticasone propionate 50 MICROgram(s)/spray Nasal Spray 1 Spray(s) Both Nostrils two times a day  lidocaine 1%/epinephrine 1:200,000 Inj 10 milliLiter(s) Local Injection once  melatonin 3 milliGRAM(s) Oral at bedtime  oxymetazoline 0.05% Nasal Spray 1 Spray(s) Both Nostrils two times a day  vancomycin  IVPB 1250 milliGRAM(s) IV Intermittent every 12 hours    MEDICATIONS  (PRN):  acetaminophen     Tablet .. 650 milliGRAM(s) Oral every 6 hours PRN Mild Pain (1 - 3), Moderate Pain (4 - 6)      Labs:                        13.2   16.16 )-----------( 218      ( 25 Oct 2022 06:00 )             39.9     10-25    139  |  102  |  9   ----------------------------<  107<H>  3.6   |  26  |  0.77    Ca    9.2      25 Oct 2022 06:00  Phos  3.4     10-25  Mg     1.80     10-25    TPro  7.2  /  Alb  4.0  /  TBili  0.5  /  DBili  x   /  AST  30  /  ALT  36  /  AlkPhos  69  10-25    PT/INR - ( 25 Oct 2022 06:00 )   PT: 14.5 sec;   INR: 1.25 ratio         PTT - ( 25 Oct 2022 06:00 )  PTT:35.0 sec    COVID-19 PCR: NotDetec (24 Oct 2022 11:02)      Radiology:

## 2022-10-26 NOTE — PROGRESS NOTE ADULT - SUBJECTIVE AND OBJECTIVE BOX
HPI: 31yo M PMH CRS followed by Dr. Rosas p/w R preseptal cellulitis x 4 days refractory to outpatient management. Similar episode in April s/p tobramycin, Zylet, oflox, and amoxicillin, July s/p Bactrim, neomycin, Sept s/p cefdinir x7d, medrol dosepak, recently s/p levaquin 10/22 with no significant improvement. Denies nasal drainage, congestion, obstruction, facial pressure/pain. Reports intact vision, no tenderness with eye movement.    Denies fevers, chills, night sweats.     Interval hx: s/p I&D of abscess from dacryocystitis yesterday night with opthalmology. Improved swelling and pain per patient. Afebrile    ICU Vital Signs Last 24 Hrs  T(C): 37 (26 Oct 2022 06:16), Max: 37.2 (25 Oct 2022 13:42)  T(F): 98.6 (26 Oct 2022 06:16), Max: 98.9 (25 Oct 2022 13:42)  HR: 79 (26 Oct 2022 06:16) (79 - 96)  BP: 118/81 (26 Oct 2022 06:16) (118/76 - 135/89)  BP(mean): --  ABP: --  ABP(mean): --  RR: 18 (26 Oct 2022 06:16) (18 - 18)  SpO2: 100% (26 Oct 2022 06:16) (100% - 100%)    O2 Parameters below as of 26 Oct 2022 06:16  Patient On (Oxygen Delivery Method): room air        Parameters below as of 24 Oct 2022 21:29  Patient On (Oxygen Delivery Method): room air    NAD, lying in hospital bed  Breathing comf on RA  Tolerating secretions  No stridor or stertor  Face: siR periorbital edema, erythema, unable to voluntarily open R eye. 1-2mm eye opening with digital manipulation. L eye normal. Orbits are soft bilaterally. no tenderness with extraocular muscle movement. No blurry/double vision.   Nose: clear, no purulent drainage bilaterally  Mouth: clear  Neck: soft/flat     Labs/imaging;     IMPRESSION:  1.  Right-to-left nasal septal deviation and leftward directed septal   spur as compromise left nasal cavity  2.  Carlota cells compromise each ostiomeatal complex, left larger than   right  3. Sporadic pattern right-sided chronic sinusitis  4. Left orbital preseptal cellulitis. No abscess collection. No   underlying acute sinusitis

## 2022-10-26 NOTE — DISCHARGE NOTE PROVIDER - CARE PROVIDER_API CALL
Aden Hughes  INTERNAL MEDICINE  [address] [place] [address]  Phone: [phone]  Fax: [phone]  Established Patient  Follow Up Time: 2 weeks

## 2022-10-26 NOTE — PROGRESS NOTE ADULT - ASSESSMENT
33 yo man with nasal septum defect and recurrent maxillary sinusitis who presents for swelling of his R eye with right periorbital cellulitis, dacrocystitis,  leukocytosis, sepsis     Howard Khan  Attending Physician   Division of Infectious Disease  Office #[phone]  Available on Microsoft Teams also  After 5pm/weekend or no response, call #[phone]

## 2022-10-26 NOTE — DISCHARGE NOTE PROVIDER - NSDCCPTREATMENT_GEN_ALL_CORE_FT
PRINCIPAL PROCEDURE  Procedure: CT orbit w con  Findings and Treatment: In right orbital preseptal soft tissues nasal aspect superficial to the lacrimal sac there is a pocket of fluid with an enhancing    incomplete margin that may be considered phlegmonous preseptal cellulitis or early abscess formation.  There is asymmetric increased fluid in the R lacrimal duct.  1.  Right-to-left nasal septal deviation and leftward directed septal   spur as compromise left nasal cavity  2.  Carlota cells compromise each ostiomeatal complex, left larger than right  3. Sporadic pattern right-sided chronic sinusitis  Facial bones are intact without fracture.    No destructive bone lesion   is found  The superficial soft tissues demonstrate asymmetric induration in the   right malar region. This includes skin thickening, subcutaneous and   deeper infiltration and thickening of the muscles of facial expression.   Greatest in medial malar region, extending medially over nose and upward to left orbital preseptal soft tissues. No radiopaque foreign body is seen. Superficial structures on left intact.  The paranasal sinuses demonstrate widespread patchy mucosal disease. This is present at multiple locations within the maxillary sinuses and within anterior and middle ethmoid air cells extending to the right nasal frontal recess. Disease is overall more extensive on the right than the left. There is also mild lobulated mucosal disease at the right sphenoethmoidal recess. On the L no significant mucosal disease is recognized. No abnormal paranasal sinus fluid collection is found.  No osseous erosion or expansion is present.  The nasal septum deviates right to left. This causes a stenosis at the   left naris. Also present are small leftward directed septal spur is that   contribute to compromise of the left nasal cavity middle meatus   indirectly deform the left inferior turbinate. Bilateral middle turbinate   asael bullosa are evident. These are partly opacified by mucosal disease The ostiomeatal complexes appear narrowed and distorted by Carlota cells, left larger than right       PRINCIPAL PROCEDURE  Procedure: CT orbit w con  Findings and Treatment: In right orbital preseptal soft tissues nasal aspect superficial to the lacrimal sac there is a pocket of fluid with an enhancing    incomplete margin that may be considered phlegmonous preseptal cellulitis or early abscess formation.  There is asymmetric increased fluid in the R lacrimal duct.  1.  Right-to-left nasal septal deviation and leftward directed septal   spur as compromise left nasal cavity  2.  Carlota cells compromise each ostiomeatal complex, left larger than right  3. Sporadic pattern right-sided chronic sinusitis  Facial bones are intact without fracture.    No destructive bone lesion   is found  The superficial soft tissues demonstrate asymmetric induration in the   right malar region. This includes skin thickening, subcutaneous and   deeper infiltration and thickening of the muscles of facial expression.   Greatest in medial malar region, extending medially over nose and upward to left orbital preseptal soft tissues. No radiopaque foreign body is seen. Superficial structures on left intact.  The paranasal sinuses demonstrate widespread patchy mucosal disease. This is present at multiple locations within the maxillary sinuses and within anterior and middle ethmoid air cells extending to the right nasal frontal recess. Disease is overall more extensive on the right than the left. There is also mild lobulated mucosal disease at the right sphenoethmoidal recess. On the L no significant mucosal disease is recognized. No abnormal paranasal sinus fluid collection is found.  No osseous erosion or expansion is present.  The nasal septum deviates right to left. This causes a stenosis at the   left naris. Also present are small leftward directed septal spur is that   contribute to compromise of the left nasal cavity middle meatus   indirectly deform the left inferior turbinate. Bilateral middle turbinate   asael bullosa are evident. These are partly opacified by mucosal disease The ostiomeatal complexes appear narrowed and distorted by Carlota cells, left larger than right      SECONDARY PROCEDURE  Procedure: Incision and drainage, lacrimal sac  Findings and Treatment: Drainage of bloody purulent discharge

## 2022-10-26 NOTE — DISCHARGE NOTE PROVIDER - HOSPITAL COURSE
Pt is a 31 yo man with nasal septum defect and recurrent maxillary sinusitis who presents for swelling of his R eye. He has seen ENT as early as 2016 who identified a severe nasal septal defect and recommended surgery. He has not had any surgery. Since then he has had multiple bouts of sinusitis. Since April, he has had R eye tearing and discharge, usually clear but sometimes yellow. He has had trials of different combinations of ophthalmic solutions of tobramycin, ofloxacin, amoxicillin, and prednisolone as well as azithromycin, which seemed to help but not resolve the issue. In early October, he started taking cefdinir and methylprednisolone. On Oct 22, started erythromycin ointment and levofloxacin. Over the past few days, the eye swelling and redness has gotten worse. Today, the eye completely shut so he came to the emergency room. It hurt this morning but not anymore and does not feel warm to the touch. His eye does not hurt to move. Denies fever, sweats, chills, loss of vision (besides eye being shut), CP, SOB, n/v/d, headache.    Hospital Course: Pt was followed by ophthalmology, ENT, and infectious disease during his stay. Treated with unasyn and vancomycin. He underwent incision and drainage of the lesion with ophthalmology which yielded bloody purulent fluid and improved the swelling. He was afebrile throughout his stay.    He is stable and medically optimized for discharge home on po antibiotics; augmentin and doxycycline to complete a 10 day course.

## 2022-10-26 NOTE — DISCHARGE NOTE PROVIDER - NSDCFUADDAPPT_GEN_ALL_CORE_FT
Please follow up with ophthalmology. Please also make an appointment with ENT to have your sinuses evaluated.

## 2022-10-26 NOTE — PROGRESS NOTE ADULT - ASSESSMENT
32yoM PMH CRS p/w R preseptal cellulitis refractory to OP management s/p I&D of abscess of dacrocystitis by opthal    - saline nasal irrigations 3-4 times a day: take toomi syringe and flush 30cc of sterile saline into each nostril over a bucket  - flonase twice a day   - afrin twice a day (3 days total only)  - then - saline nasal spray BID x 3 weeks  - unasyn/vanco x 48 hrs  - appreciate daily ophtho evals  - appreciate ID   - will follow

## 2022-10-26 NOTE — PROGRESS NOTE ADULT - SUBJECTIVE AND OBJECTIVE BOX
[facility] DEPARTMENT OF OPHTHALMOLOGY  ------------------------------------------------------------------------------  Octaviano Lance MD PGY 3  [phone]  ------------------------------------------------------------------------------    Interval History: No acute events overnight. Reports improvement in swelling and pain. Following for preseptal cellulitis and dacryo, s/p incision and drainage    MEDICATIONS  (STANDING):  ampicillin/sulbactam  IVPB      ampicillin/sulbactam  IVPB 3 Gram(s) IV Intermittent every 6 hours  dexamethasone/neomycin/polymyxin Suspension 1 Drop(s) Right EYE four times a day  enoxaparin Injectable 40 milliGRAM(s) SubCutaneous every 24 hours  fluticasone propionate 50 MICROgram(s)/spray Nasal Spray 1 Spray(s) Both Nostrils two times a day  oxymetazoline 0.05% Nasal Spray 1 Spray(s) Both Nostrils two times a day  vancomycin  IVPB 1000 milliGRAM(s) IV Intermittent every 12 hours    MEDICATIONS  (PRN):      VITALS: T(C): 36.5 (10-25-22 @ 06:38)  T(F): 97.7 (10-25-22 @ 06:38), Max: 98.1 (10-24-22 @ 18:02)  HR: 90 (10-25-22 @ 06:38) (90 - 104)  BP: 117/70 (10-25-22 @ 06:38) (117/70 - 129/78)  RR:  (18 - 18)  SpO2:  (98% - 100%)  Wt(kg): --  General: AAO x 3, appropriate mood and affect    Ophthalmology Exam:  Visual acuity (sc): 20/20 OD, 20/20 OS  Pupils: PERRL OU, no APD  Ttono: 23 OD, 19 OS  Extraocular movements (EOMs): Grossly full OD possibly 10-20% restriction on abduction OD; full OS      Pen Light Exam (PLE)  External: 1+ periorbital edema and 1+ erythema with tenderness to palpation OD; Flat OS; no proptosis, soft lid edema, no RTR  Lids/Lashes/Lacrimal Ducts: 1+ periorbital edema lower>upper and 1+ erythema with tenderness to palpation OD; Flat OS; no discharge expressed from punctum; nodule at medial meatus exquisitely tender to palpation  Sclera/Conjunctiva: 1+ temporal chemosis OD; W+Q OS  Cornea: Cl OU  Anterior Chamber: D+F OU    Iris: Flat OU  Lens: Cl OU    CT Orbits     In the right orbital   preseptal soft tissues nasal aspect superficial to the lacrimal sac such   as on axial image 17 there is a pocket of fluid with an enhancing    incomplete margin that may be considered phlegmonous preseptal cellulitis   or early abscess formation.  There is asymmetric increased fluid in the   right lacrimal duct.   [facility] DEPARTMENT OF OPHTHALMOLOGY  ------------------------------------------------------------------------------  Octaviano Lance MD PGY 3  [phone]  ------------------------------------------------------------------------------    Interval History: No acute events overnight. Reports improvement in swelling and pain. Following for preseptal cellulitis and dacryo, s/p incision and drainage    MEDICATIONS  (STANDING):  ampicillin/sulbactam  IVPB      ampicillin/sulbactam  IVPB 3 Gram(s) IV Intermittent every 6 hours  dexamethasone/neomycin/polymyxin Suspension 1 Drop(s) Right EYE four times a day  enoxaparin Injectable 40 milliGRAM(s) SubCutaneous every 24 hours  fluticasone propionate 50 MICROgram(s)/spray Nasal Spray 1 Spray(s) Both Nostrils two times a day  oxymetazoline 0.05% Nasal Spray 1 Spray(s) Both Nostrils two times a day  vancomycin  IVPB 1000 milliGRAM(s) IV Intermittent every 12 hours    MEDICATIONS  (PRN):      VITALS: T(C): 36.5 (10-25-22 @ 06:38)  T(F): 97.7 (10-25-22 @ 06:38), Max: 98.1 (10-24-22 @ 18:02)  HR: 90 (10-25-22 @ 06:38) (90 - 104)  BP: 117/70 (10-25-22 @ 06:38) (117/70 - 129/78)  RR:  (18 - 18)  SpO2:  (98% - 100%)  Wt(kg): --  General: AAO x 3, appropriate mood and affect    Ophthalmology Exam:  Visual acuity (sc): 20/20 OD, 20/20 OS  Pupils: PERRL OU, no APD  Ttono: 19 OD, 17 OS  Extraocular movements (EOMs): Grossly full OD possibly 10-20% restriction on abduction OD; full OS      Pen Light Exam (PLE)  External: 1+ periorbital edema and 1+ erythema with tenderness to palpation OD; Flat OS; no proptosis, soft lid edema, no RTR  Lids/Lashes/Lacrimal Ducts: 1+ periorbital edema lower>upper and 1+ erythema with tenderness to palpation OD; Flat OS; no discharge expressed from punctum; nodule at medial meatus exquisitely tender to palpation; scab at site of I&D  Sclera/Conjunctiva: 1+ temporal chemosis OD; W+Q OS  Cornea: Cl OU  Anterior Chamber: D+F OU    Iris: Flat OU  Lens: Cl OU    CT Orbits     In the right orbital   preseptal soft tissues nasal aspect superficial to the lacrimal sac such   as on axial image 17 there is a pocket of fluid with an enhancing    incomplete margin that may be considered phlegmonous preseptal cellulitis   or early abscess formation.  There is asymmetric increased fluid in the   right lacrimal duct.

## 2022-10-26 NOTE — DISCHARGE NOTE PROVIDER - NSDCCPCAREPLAN_GEN_ALL_CORE_FT
PRINCIPAL DISCHARGE DIAGNOSIS  Diagnosis: Preseptal cellulitis  Assessment and Plan of Treatment: You came to the hospital because of an infection around the area near your eye. There was no involvement of the eye itself. You were given antibiotics and the are was drained. You were seen by eye doctors, ENT doctors, and infectious disease doctors. Please follow up with an eye doctor to ensure proper healing and an ENT to discuss your sinuses and if there is anything that can be done to help prevent infections in the future. Please take all antibiotics you were given as they were prescribed.       PRINCIPAL DISCHARGE DIAGNOSIS  Diagnosis: Preseptal cellulitis  Assessment and Plan of Treatment: You came to the hospital because of an infection around the area near your eye. There was no involvement of the eye itself. You were given antibiotics and the are was drained. You were seen by eye doctors, ENT doctors, and infectious disease doctors. Please follow up with an eye doctor to ensure proper healing and an ENT to discuss your sinuses and if there is anything that can be done to help prevent infections in the future. Please take all antibiotics you were given as they were prescribed.  -saline nasal spray twice a day for 3 weeks  -Polytrim 4 times per day in right eye   -Can wash incision site  -warm compress three time a day  -Augmentin 875 mg twice daily + doxycyline 100 mg twice daily for 77 more days       PRINCIPAL DISCHARGE DIAGNOSIS  Diagnosis: Preseptal cellulitis  Assessment and Plan of Treatment: You came to the hospital because of an infection around the area near your eye. There was no involvement of the eye itself. You were given antibiotics and the are was drained. You were seen by eye doctors, ENT doctors, and infectious disease doctors. Please follow up with an eye doctor to ensure proper healing and an ENT to discuss your sinuses and if there is anything that can be done to help prevent infections in the future. Please take all antibiotics you were given as they were prescribed.  -saline nasal spray twice a day for 3 weeks  -Polytrim 4 times per day in right eye   -Can wash incision site  - saline nasal irrigations 3-4 times a day: take toomi syringe and flush 30cc of sterile saline into each nostril over a bucket  -warm compress three time a day  -Augmentin 875 mg twice daily + doxycyline 100 mg twice daily for 7 more days

## 2022-10-26 NOTE — DISCHARGE NOTE PROVIDER - NSDCMRMEDTOKEN_GEN_ALL_CORE_FT
amoxicillin-clavulanate 875 mg-125 mg oral tablet: 1 tab(s) orally 2 times a day   doxycycline monohydrate 100 mg oral capsule: 1 cap(s) orally 2 times a day    amoxicillin-clavulanate 875 mg-125 mg oral tablet: 1 tab(s) orally 2 times a day   doxycycline monohydrate 100 mg oral capsule: 1 cap(s) orally 2 times a day   polymyxin B-trimethoprim 10,000 units-1 mg/mL ophthalmic solution: 1 drop(s) to Right eye 4 times a day   amoxicillin-clavulanate 875 mg-125 mg oral tablet: 1 tab(s) orally 2 times a day   doxycycline monohydrate 100 mg oral capsule: 1 cap(s) orally 2 times a day   Flonase 50 mcg/inh nasal spray: 1 spray(s) nasal 2 times a day  polymyxin B-trimethoprim 10,000 units-1 mg/mL ophthalmic solution: 1 drop(s) to Right eye 4 times a day  Saline Nasal Mist 0.65% nasal solution: 2 drop(s) nasal 3 times a day

## 2022-10-26 NOTE — PROGRESS NOTE ADULT - PROBLEM SELECTOR PLAN 1
CT orbit: orbital preseptal cellulitis. No abscess collection. No underlying acute sinusitis  - ophtho consulted, appreciate recs  - maxitrol 1 drop qid  - warm compress to area TID  - pedning I&D with optho CT orbit: orbital preseptal cellulitis. Possible early abscess collection. No underlying acute sinusitis  - ophtho consulted, appreciate recs  - maxitrol 1 drop qid  - warm compress to area TID  - s/p I&D with optho  - cw vanco and unasyn for now, at least one more day iv per ophtho, awaiting ID recs  - outpt f/u optho

## 2022-10-27 ENCOUNTER — TRANSCRIPTION ENCOUNTER (OUTPATIENT)
Age: 32
End: 2022-10-27

## 2022-10-27 VITALS
RESPIRATION RATE: 18 BRPM | OXYGEN SATURATION: 99 % | HEART RATE: 86 BPM | TEMPERATURE: 98 F | SYSTOLIC BLOOD PRESSURE: 122 MMHG | DIASTOLIC BLOOD PRESSURE: 78 MMHG

## 2022-10-27 LAB — VANCOMYCIN TROUGH SERPL-MCNC: 8 UG/ML — LOW (ref 10–20)

## 2022-10-27 PROCEDURE — 99239 HOSP IP/OBS DSCHRG MGMT >30: CPT | Mod: GC

## 2022-10-27 PROCEDURE — 99232 SBSQ HOSP IP/OBS MODERATE 35: CPT

## 2022-10-27 RX ORDER — POLYMYXIN B SULF/TRIMETHOPRIM 10000-1/ML
1 DROPS OPHTHALMIC (EYE)
Refills: 0 | Status: DISCONTINUED | OUTPATIENT
Start: 2022-10-27 | End: 2022-10-27

## 2022-10-27 RX ORDER — SODIUM CHLORIDE 0.65 %
2 AEROSOL, SPRAY (ML) NASAL
Qty: 1 | Refills: 0
Start: 2022-10-27 | End: 2022-11-02

## 2022-10-27 RX ORDER — POLYMYXIN B SULF/TRIMETHOPRIM 10000-1/ML
1 DROPS OPHTHALMIC (EYE)
Qty: 5 | Refills: 0
Start: 2022-10-27 | End: 2022-11-09

## 2022-10-27 RX ORDER — SODIUM CHLORIDE 9 MG/ML
500 INJECTION INTRAMUSCULAR; INTRAVENOUS; SUBCUTANEOUS
Refills: 0 | Status: DISCONTINUED | OUTPATIENT
Start: 2022-10-27 | End: 2022-10-27

## 2022-10-27 RX ADMIN — AMPICILLIN SODIUM AND SULBACTAM SODIUM 200 GRAM(S): 250; 125 INJECTION, POWDER, FOR SUSPENSION INTRAMUSCULAR; INTRAVENOUS at 05:25

## 2022-10-27 RX ADMIN — Medication 1 SPRAY(S): at 06:25

## 2022-10-27 RX ADMIN — Medication 166.67 MILLIGRAM(S): at 00:21

## 2022-10-27 RX ADMIN — SODIUM CHLORIDE 100 MILLILITER(S): 9 INJECTION INTRAMUSCULAR; INTRAVENOUS; SUBCUTANEOUS at 15:14

## 2022-10-27 RX ADMIN — Medication 1 DROP(S): at 00:22

## 2022-10-27 RX ADMIN — AMPICILLIN SODIUM AND SULBACTAM SODIUM 200 GRAM(S): 250; 125 INJECTION, POWDER, FOR SUSPENSION INTRAMUSCULAR; INTRAVENOUS at 11:50

## 2022-10-27 RX ADMIN — Medication 1 DROP(S): at 06:25

## 2022-10-27 RX ADMIN — Medication 1 DROP(S): at 15:14

## 2022-10-27 RX ADMIN — ENOXAPARIN SODIUM 40 MILLIGRAM(S): 100 INJECTION SUBCUTANEOUS at 06:24

## 2022-10-27 RX ADMIN — OXYMETAZOLINE HYDROCHLORIDE 1 SPRAY(S): 0.5 SPRAY NASAL at 06:24

## 2022-10-27 NOTE — PROGRESS NOTE ADULT - PROBLEM SELECTOR PLAN 3
DVT: SCDs  Dispo: Home  Diet: regular
-s/p I+D - no cx sent  -local care per Ophthal  -cont abx   -better  -outpt f/u with ophthal
-s/p I+D - no cx sent  -local care per Ophthal  -cont abx   -seems better

## 2022-10-27 NOTE — PROGRESS NOTE ADULT - SUBJECTIVE AND OBJECTIVE BOX
Internal Medicine Progress Note    Patient is a 32y old  Male who presents with a chief complaint of eye swelling (26 Oct 2022 14:31)    OVERNIGHT EVENTS/SUBJECTIVE:    OBJECTIVE:  Vital Signs Last 24 Hrs  T(C): 36.8 (26 Oct 2022 21:53), Max: 37.1 (26 Oct 2022 15:35)  T(F): 98.2 (26 Oct 2022 21:53), Max: 98.8 (26 Oct 2022 15:35)  HR: 94 (26 Oct 2022 21:53) (80 - 94)  BP: 123/80 (26 Oct 2022 21:53) (108/74 - 123/80)  BP(mean): --  RR: 18 (26 Oct 2022 21:53) (18 - 18)  SpO2: 100% (26 Oct 2022 21:53) (100% - 100%)    Parameters below as of 26 Oct 2022 21:53  Patient On (Oxygen Delivery Method): room air      I&O's Detail    26 Oct 2022 07:01  -  27 Oct 2022 07:00  --------------------------------------------------------  IN:    IV PiggyBack: 550 mL    Oral Fluid: 1140 mL  Total IN: 1690 mL    OUT:    Voided (mL): 600 mL  Total OUT: 600 mL    Total NET: 1090 mL        Daily     Daily   Physical Exam:  General: NAD, resting comfortably in bed  Neuro: A&Ox4, 5/5 strength in all ext  HEENT: NC/AT, EOMI, normal hearing, oral mucosa moist, no oral lesions noted, no pharyngeal erythema, uvula midline  Neck: supple, thyroid not enlarged, no LAD  Resp: Breathing comfortably on RA, LCTA b/l  CV: Normal sinus rhythm, S1 and S2, no r/m/g  Abd: soft, non-distended, non-tender. No HSM.  Ext: ROMIx4, no edema, +2 pulses bilaterally  Skin: Warm and dry, no rashes or discolorations  Psych: Appropriate affect    Medications:  MEDICATIONS  (STANDING):  ampicillin/sulbactam  IVPB      ampicillin/sulbactam  IVPB 3 Gram(s) IV Intermittent every 6 hours  dexamethasone/neomycin/polymyxin Suspension 1 Drop(s) Right EYE four times a day  enoxaparin Injectable 40 milliGRAM(s) SubCutaneous every 24 hours  fluticasone propionate 50 MICROgram(s)/spray Nasal Spray 1 Spray(s) Both Nostrils two times a day  lidocaine 1%/epinephrine 1:200,000 Inj 10 milliLiter(s) Local Injection once  melatonin 3 milliGRAM(s) Oral at bedtime  oxymetazoline 0.05% Nasal Spray 1 Spray(s) Both Nostrils two times a day  vancomycin  IVPB 1250 milliGRAM(s) IV Intermittent every 12 hours    MEDICATIONS  (PRN):  acetaminophen     Tablet .. 650 milliGRAM(s) Oral every 6 hours PRN Mild Pain (1 - 3), Moderate Pain (4 - 6)      Labs:                        13.1   13.08 )-----------( 231      ( 26 Oct 2022 05:40 )             40.6     10-26    137  |  99  |  11  ----------------------------<  100<H>  3.8   |  26  |  0.74    Ca    9.3      26 Oct 2022 05:40  Phos  4.6     10-26  Mg     2.20     10-26          COVID-19 PCR: NotDetec (24 Oct 2022 11:02)      Radiology: Internal Medicine Progress Note    Patient is a 32y old  Male who presents with a chief complaint of eye swelling (26 Oct 2022 14:31)    OVERNIGHT EVENTS/SUBJECTIVE: Pt feeling well this morning, eye feels much better. Opening it near fully now. Some discharge still which he wipes away. Denies fever, HA, vision changes, cp , sob, palps, abd pain, n/v/d.    OBJECTIVE:  Vital Signs Last 24 Hrs  T(C): 36.8 (26 Oct 2022 21:53), Max: 37.1 (26 Oct 2022 15:35)  T(F): 98.2 (26 Oct 2022 21:53), Max: 98.8 (26 Oct 2022 15:35)  HR: 94 (26 Oct 2022 21:53) (80 - 94)  BP: 123/80 (26 Oct 2022 21:53) (108/74 - 123/80)  BP(mean): --  RR: 18 (26 Oct 2022 21:53) (18 - 18)  SpO2: 100% (26 Oct 2022 21:53) (100% - 100%)    Parameters below as of 26 Oct 2022 21:53  Patient On (Oxygen Delivery Method): room air      I&O's Detail    26 Oct 2022 07:01  -  27 Oct 2022 07:00  --------------------------------------------------------  IN:    IV PiggyBack: 550 mL    Oral Fluid: 1140 mL  Total IN: 1690 mL    OUT:    Voided (mL): 600 mL  Total OUT: 600 mL    Total NET: 1090 mL        Daily     Daily   Physical Exam:  General: NAD, resting comfortably in bed  Neuro: A&Ox4, no gross deficits   HEENT: NC/AT, R eye: able to open 75%, incision scabbing over, no active discharge, no TTP, no fluctuance, EOMI, normal hearing, oral mucosa moist, no oral lesions noted, no pharyngeal erythema, uvula midline  Neck: supple, no LAD  Resp: Breathing comfortably on RA, LCTA b/l  CV: Normal sinus rhythm, S1 and S2, no r/m/g  Abd: soft, non-distended, non-tender.   Ext: no edema, +2 pulses bilaterally  Skin: Warm and dry, no rashes or discolorations  Psych: Appropriate affect    Medications:  MEDICATIONS  (STANDING):  ampicillin/sulbactam  IVPB      ampicillin/sulbactam  IVPB 3 Gram(s) IV Intermittent every 6 hours  dexamethasone/neomycin/polymyxin Suspension 1 Drop(s) Right EYE four times a day  enoxaparin Injectable 40 milliGRAM(s) SubCutaneous every 24 hours  fluticasone propionate 50 MICROgram(s)/spray Nasal Spray 1 Spray(s) Both Nostrils two times a day  lidocaine 1%/epinephrine 1:200,000 Inj 10 milliLiter(s) Local Injection once  melatonin 3 milliGRAM(s) Oral at bedtime  oxymetazoline 0.05% Nasal Spray 1 Spray(s) Both Nostrils two times a day  vancomycin  IVPB 1250 milliGRAM(s) IV Intermittent every 12 hours    MEDICATIONS  (PRN):  acetaminophen     Tablet .. 650 milliGRAM(s) Oral every 6 hours PRN Mild Pain (1 - 3), Moderate Pain (4 - 6)      Labs:                        13.1   13.08 )-----------( 231      ( 26 Oct 2022 05:40 )             40.6     10-26    137  |  99  |  11  ----------------------------<  100<H>  3.8   |  26  |  0.74    Ca    9.3      26 Oct 2022 05:40  Phos  4.6     10-26  Mg     2.20     10-26          COVID-19 PCR: Jermantereddy (24 Oct 2022 11:02)      Radiology:

## 2022-10-27 NOTE — PROGRESS NOTE ADULT - PROBLEM SELECTOR PROBLEM 1
Preseptal cellulitis
Sepsis due to cellulitis
Sepsis due to cellulitis

## 2022-10-27 NOTE — PROGRESS NOTE ADULT - PROBLEM SELECTOR PLAN 2
CT scan: 1.  Right-to-left nasal septal deviation and leftward directed septal spur as compromise left nasal cavity 2.  Carlota cells compromise each ostiomeatal complex, left larger than right 3. Sporadic pattern right-sided chronic sinusitis.  - ENT consulted, appreciate recs  - flonase bid  - afrin bid x3d --> then, saline nasal spray bid x3 weeks   - unasyn 3g q6  - vanc 1g q12  - ID consulted, appreciate recs CT scan: 1.  Right-to-left nasal septal deviation and leftward directed septal spur as compromise left nasal cavity 2.  Carlota cells compromise each ostiomeatal complex, left larger than right 3. Sporadic pattern right-sided chronic sinusitis.  - ENT consulted, appreciate recs  - flonase bid  - afrin bid x3d --> then, saline nasal spray bid x3 weeks   - unasyn 3g q6  - vanc 1g q12

## 2022-10-27 NOTE — PROGRESS NOTE ADULT - REASON FOR ADMISSION
eye swelling

## 2022-10-27 NOTE — PROGRESS NOTE ADULT - PROVIDER SPECIALTY LIST ADULT
Ophthalmology
ENT
ENT
Ophthalmology
ENT
Ophthalmology
Internal Medicine
Infectious Disease
Infectious Disease

## 2022-10-27 NOTE — PROGRESS NOTE ADULT - SUBJECTIVE AND OBJECTIVE BOX
PANDA ROMANO 32y [id number]    Patient is a 32y old  Male who presents with a chief complaint of eye swelling (27 Oct 2022 08:58)      Follow Up/CC:  ID following for cellulitis     Interval History/ROS: no fever, doing better    Allergies    No Known Allergies    Intolerances        ANTIMICROBIALS:  ampicillin/sulbactam  IVPB    ampicillin/sulbactam  IVPB 3 every 6 hours  vancomycin  IVPB 1250 every 12 hours      MEDICATIONS  (STANDING):  ampicillin/sulbactam  IVPB      ampicillin/sulbactam  IVPB 3 Gram(s) IV Intermittent every 6 hours  enoxaparin Injectable 40 milliGRAM(s) SubCutaneous every 24 hours  fluticasone propionate 50 MICROgram(s)/spray Nasal Spray 1 Spray(s) Both Nostrils two times a day  lidocaine 1%/epinephrine 1:200,000 Inj 10 milliLiter(s) Local Injection once  melatonin 3 milliGRAM(s) Oral at bedtime  oxymetazoline 0.05% Nasal Spray 1 Spray(s) Both Nostrils two times a day  trimethoprim/polymyxin Solution 1 Drop(s) Right EYE four times a day  vancomycin  IVPB 1250 milliGRAM(s) IV Intermittent every 12 hours    MEDICATIONS  (PRN):  acetaminophen     Tablet .. 650 milliGRAM(s) Oral every 6 hours PRN Mild Pain (1 - 3), Moderate Pain (4 - 6)        Vital Signs Last 24 Hrs  T(C): 36.8 (27 Oct 2022 06:23), Max: 37.1 (26 Oct 2022 15:35)  T(F): 98.2 (27 Oct 2022 06:23), Max: 98.8 (26 Oct 2022 15:35)  HR: 84 (27 Oct 2022 06:23) (80 - 94)  BP: 111/78 (27 Oct 2022 06:23) (108/74 - 123/80)  BP(mean): --  RR: 16 (27 Oct 2022 06:23) (16 - 18)  SpO2: 100% (27 Oct 2022 06:23) (100% - 100%)    Parameters below as of 27 Oct 2022 06:23  Patient On (Oxygen Delivery Method): room air        CBC Full  -  ( 26 Oct 2022 05:40 )  WBC Count : 13.08 K/uL  RBC Count : 4.25 M/uL  Hemoglobin : 13.1 g/dL  Hematocrit : 40.6 %  Platelet Count - Automated : 231 K/uL  Mean Cell Volume : 95.5 fL  Mean Cell Hemoglobin : 30.8 pg  Mean Cell Hemoglobin Concentration : 32.3 gm/dL  Auto Neutrophil # : x  Auto Lymphocyte # : x  Auto Monocyte # : x  Auto Eosinophil # : x  Auto Basophil # : x  Auto Neutrophil % : x  Auto Lymphocyte % : x  Auto Monocyte % : x  Auto Eosinophil % : x  Auto Basophil % : x    10-26    137  |  99  |  11  ----------------------------<  100<H>  3.8   |  26  |  0.74    Ca    9.3      26 Oct 2022 05:40  Phos  4.6     10-26  Mg     2.20     10-26            MICROBIOLOGY:  .Blood Blood-Peripheral  10-24-22   No growth to date.  --  --      .Blood Blood-Peripheral  10-24-22   No growth to date.  --  --      RADIOLOGY

## 2022-10-27 NOTE — PROGRESS NOTE ADULT - PROBLEM SELECTOR PLAN 2
-improving  -cont abx  -change IV abx to PO abx - Augmentin 875 mg po bid + doxycyline 100 mg po bid x 7 more days   -potential side effects of abx explained including GI issues, allergy issues, pill esophagitis and photosensitivity  -DC planning

## 2022-10-27 NOTE — PROGRESS NOTE ADULT - ASSESSMENT
33 yo man with nasal septum defect and recurrent maxillary sinusitis who presents for swelling of his R eye with right periorbital cellulitis, dacrocystitis,  leukocytosis, sepsis     Howard Khan  Attending Physician   Division of Infectious Disease  Office #[phone]  Available on Microsoft Teams also  After 5pm/weekend or no response, call #[phone]

## 2022-10-27 NOTE — PROGRESS NOTE ADULT - SUBJECTIVE AND OBJECTIVE BOX
HPI: 31yo M PMH CRS followed by Dr. Rosas p/w R preseptal cellulitis x 4 days refractory to outpatient management. Similar episode in April s/p tobramycin, Zylet, oflox, and amoxicillin, July s/p Bactrim, neomycin, Sept s/p cefdinir x7d, medrol dosepak, recently s/p levaquin 10/22 with no significant improvement. Denies nasal drainage, congestion, obstruction, facial pressure/pain. Reports intact vision, no tenderness with eye movement.    Denies fevers, chills, night sweats.     Interval hx: continued improvement in pain and swelling, patient able to open eye without assistance.    ICU Vital Signs Last 24 Hrs  T(C): 36.8 (26 Oct 2022 21:53), Max: 37.1 (26 Oct 2022 15:35)  T(F): 98.2 (26 Oct 2022 21:53), Max: 98.8 (26 Oct 2022 15:35)  HR: 94 (26 Oct 2022 21:53) (80 - 94)  BP: 123/80 (26 Oct 2022 21:53) (108/74 - 123/80)  BP(mean): --  ABP: --  ABP(mean): --  RR: 18 (26 Oct 2022 21:53) (18 - 18)  SpO2: 100% (26 Oct 2022 21:53) (100% - 100%)    O2 Parameters below as of 26 Oct 2022 21:53  Patient On (Oxygen Delivery Method): room air      NAD, lying in hospital bed  Breathing comf on RA  Tolerating secretions  No stridor or stertor  Face: periorbital edema, erythema, able to voluntarily open R eye. L eye normal. Orbits are soft bilaterally. no tenderness with extraocular muscle movement. No blurry/double vision.   Nose: clear, no purulent drainage bilaterally  Mouth: clear  Neck: soft/flat     Labs/imaging;     IMPRESSION:  1.  Right-to-left nasal septal deviation and leftward directed septal   spur as compromise left nasal cavity  2.  Carlota cells compromise each ostiomeatal complex, left larger than   right  3. Sporadic pattern right-sided chronic sinusitis  4. Left orbital preseptal cellulitis. No abscess collection. No   underlying acute sinusitis

## 2022-10-27 NOTE — PROGRESS NOTE ADULT - ASSESSMENT
32y male w/ pmhx/ochx of none consulted for preseptal cellulitis. Found to have likely preseptal cellulitis and dacryocystitis of the right side  # Preseptal cellulitis with likely dacryocystitis, right side  - Afebrile, WBC 17, CRP 60  - Vision intact, no sign of optic nerve dysfunction  - Initially with 1+ periorbital edema lower>upper and 1+ erythema with tenderness to palpation OD; no proptosis, soft lid edema, no RTR, no discharge expressed from punctum, nodule at medial meatus exquisitely tender to palpation.   - Erythema and edema significantly improved today   - Extraocular movements full both eyes; chemosis resolved  - Posterior segment exam shows no acute abnormalities  - CT orbits with chronic sinusitis, left preseptal cellulitis with abscess collection in the lacrimal sac  - MRSA swab negative  - Switch maxitrol to Polytrim QID right eye on discharge until we see in office next week (ordered by ophtho)  - Can wash incision site  - OK to be discharged on PO antibiotics per ID, duration per ID  - Will schedule DCR during outpatient visit  - Continue with warm compress TID  - s/p I&D on 10/25 with significant improvement   - Findings and plan discussed with patient and primary team.    Discussed with Dr Martinez, oculoplastics    Outpatient follow-up: Patient should follow-up with his/her ophthalmologist or with [facility] Department of Ophthalmology at the address below     [address]  [place] [address]  [phone]

## 2022-10-27 NOTE — DISCHARGE NOTE NURSING/CASE MANAGEMENT/SOCIAL WORK - NSDCPNINST_GEN_ALL_CORE
Patient alert and oriented x 4. No s/s of acute distress. Minimal redness and swelling to right periorbital area of eye.

## 2022-10-27 NOTE — PROGRESS NOTE ADULT - PROBLEM SELECTOR PROBLEM 3
Prophylactic measure
Dacrocystitis, right
Dacrocystitis, right

## 2022-10-27 NOTE — PROGRESS NOTE ADULT - SUBJECTIVE AND OBJECTIVE BOX
[facility] DEPARTMENT OF OPHTHALMOLOGY  ------------------------------------------------------------------------------  Octaviano Lance MD PGY 3  [phone]  ------------------------------------------------------------------------------    Interval History: No acute events overnight. Reports improvement in swelling and pain. Following for preseptal cellulitis and dacryo, s/p incision and drainage    MEDICATIONS  (STANDING):  ampicillin/sulbactam  IVPB      ampicillin/sulbactam  IVPB 3 Gram(s) IV Intermittent every 6 hours  dexamethasone/neomycin/polymyxin Suspension 1 Drop(s) Right EYE four times a day  enoxaparin Injectable 40 milliGRAM(s) SubCutaneous every 24 hours  fluticasone propionate 50 MICROgram(s)/spray Nasal Spray 1 Spray(s) Both Nostrils two times a day  oxymetazoline 0.05% Nasal Spray 1 Spray(s) Both Nostrils two times a day  vancomycin  IVPB 1000 milliGRAM(s) IV Intermittent every 12 hours    MEDICATIONS  (PRN):      VITALS: T(C): 36.5 (10-25-22 @ 06:38)  T(F): 97.7 (10-25-22 @ 06:38), Max: 98.1 (10-24-22 @ 18:02)  HR: 90 (10-25-22 @ 06:38) (90 - 104)  BP: 117/70 (10-25-22 @ 06:38) (117/70 - 129/78)  RR:  (18 - 18)  SpO2:  (98% - 100%)  Wt(kg): --  General: AAO x 3, appropriate mood and affect    Ophthalmology Exam:  Visual acuity (sc): 20/20 OD, 20/20 OS  Pupils: PERRL OU, no APD  Ttono: 18 OD, 15 OS  Extraocular movements (EOMs): Full OU      Pen Light Exam (PLE)  External: 1+ periorbital edema and tr+ erythema with tenderness to palpation OD; Flat OS; no proptosis, soft lid edema, no RTR  Lids/Lashes/Lacrimal Ducts: 1+ periorbital edema lower>upper and tr+ erythema with tenderness to palpation OD; Flat OS; no discharge expressed from punctum; scab at site of I&D  Sclera/Conjunctiva: 1+ injection OD; W+Q OS  Cornea: Cl OU  Anterior Chamber: D+F OU    Iris: Flat OU  Lens: Cl OU    CT Orbits     In the right orbital   preseptal soft tissues nasal aspect superficial to the lacrimal sac such   as on axial image 17 there is a pocket of fluid with an enhancing    incomplete margin that may be considered phlegmonous preseptal cellulitis   or early abscess formation.  There is asymmetric increased fluid in the   right lacrimal duct.

## 2022-10-27 NOTE — PROGRESS NOTE ADULT - ATTENDING COMMENTS
Pt is a 31 yo man with nasal septum defect and recurrent maxillary sinusitis who presents for swelling of his R eye. Given his failure of several antibiotic trials and nasal septal defect, likely preorbital cellulitis in the setting of maxillary sinusitis.   Patient clinically improved , will DC on Augmentin and Doxycycline per ID.  F/w opthalmology and and ENT outpatient.  I spent 38 min coordinating DC.
31 yo man with nasal septum defect and recurrent maxillary sinusitis who presents for swelling of his R eye. Given his failure of several antibiotic trials and nasal septal defect, likely preorbital cellulitis in the setting of maxillary sinusitis.   Opthalmology , ENT and ID consult appreciated .  C/w atb as above   F/w cultures  F/w Above consults recs.
Pt is a 33 yo man with nasal septum defect and recurrent maxillary sinusitis who presents for swelling of his R eye. Given his failure of several antibiotic trials and nasal septal defect, likely preorbital cellulitis in the setting of maxillary sinusitis.   Patient is s/p I&D abscess, vision and inflammation improved.  F/w Cultures, c/w atb per ID  F/w optha recs  DC planning pending medical optimization.

## 2022-10-27 NOTE — PROGRESS NOTE ADULT - PROBLEM SELECTOR PLAN 1
CT orbit: orbital preseptal cellulitis. Possible early abscess collection. No underlying acute sinusitis  - ophtho consulted, appreciate recs  - maxitrol 1 drop qid  - warm compress to area TID  - s/p I&D with optho  - cw vanco and unasyn for now, at least one more day iv per ophtho, awaiting ID recs  - outpt f/u optho CT orbit: orbital preseptal cellulitis. Possible early abscess collection. No underlying acute sinusitis  - ophtho consulted, appreciate recs  - maxitrol 1 drop qid  - warm compress to area TID  - s/p I&D with optho  - change to po abx for discharge per ID: augmentin and doxycycline   - outpt f/u optho and ENT

## 2022-10-27 NOTE — DISCHARGE NOTE NURSING/CASE MANAGEMENT/SOCIAL WORK - PATIENT PORTAL LINK FT
You can access the FollowMyHealth Patient Portal offered by [facility] by registering at the following website: [email or web address]. By joining [facility]’s FollowMyHealth portal, you will also be able to view your health information using other applications (apps) compatible with our system.

## 2022-10-27 NOTE — PROGRESS NOTE ADULT - PROBLEM SELECTOR PROBLEM 2
Right maxillary sinusitis
Periorbital cellulitis
Periorbital cellulitis

## 2022-10-28 PROBLEM — J34.2 DEVIATED NASAL SEPTUM: Chronic | Status: ACTIVE | Noted: 2022-10-24

## 2022-10-29 LAB
CULTURE RESULTS: SIGNIFICANT CHANGE UP
CULTURE RESULTS: SIGNIFICANT CHANGE UP
SPECIMEN SOURCE: SIGNIFICANT CHANGE UP
SPECIMEN SOURCE: SIGNIFICANT CHANGE UP

## 2022-11-08 ENCOUNTER — APPOINTMENT (OUTPATIENT)
Dept: OPHTHALMOLOGY | Facility: CLINIC | Age: 32
End: 2022-11-08

## 2022-11-08 ENCOUNTER — NON-APPOINTMENT (OUTPATIENT)
Age: 32
End: 2022-11-08

## 2022-11-08 PROCEDURE — 92285 EXTERNAL OCULAR PHOTOGRAPHY: CPT

## 2022-11-08 PROCEDURE — 99214 OFFICE O/P EST MOD 30 MIN: CPT

## 2022-11-29 ENCOUNTER — APPOINTMENT (OUTPATIENT)
Dept: OPHTHALMOLOGY | Facility: CLINIC | Age: 32
End: 2022-11-29

## 2022-12-14 ENCOUNTER — APPOINTMENT (OUTPATIENT)
Dept: OTOLARYNGOLOGY | Facility: CLINIC | Age: 32
End: 2022-12-14

## 2022-12-14 VITALS
SYSTOLIC BLOOD PRESSURE: 122 MMHG | DIASTOLIC BLOOD PRESSURE: 84 MMHG | BODY MASS INDEX: 20.38 KG/M2 | HEIGHT: 68 IN | HEART RATE: 83 BPM | WEIGHT: 134.48 LBS | TEMPERATURE: 98 F

## 2022-12-14 DIAGNOSIS — J01.01 ACUTE RECURRENT MAXILLARY SINUSITIS: ICD-10-CM

## 2022-12-14 DIAGNOSIS — H05.019 CELLULITIS OF UNSPECIFIED ORBIT: ICD-10-CM

## 2022-12-14 DIAGNOSIS — H05.012 CELLULITIS OF LEFT ORBIT: ICD-10-CM

## 2022-12-14 PROCEDURE — 31231 NASAL ENDOSCOPY DX: CPT

## 2022-12-14 PROCEDURE — 99205 OFFICE O/P NEW HI 60 MIN: CPT | Mod: 25,57

## 2022-12-14 NOTE — REASON FOR VISIT
[Initial Evaluation] : an initial evaluation for [Spouse] : spouse [FreeTextEntry2] : sinusitis, left orbital preseptal cellulitis, PE exam showed right orbit

## 2022-12-14 NOTE — CONSULT LETTER
[Dear  ___] : Dear  [unfilled], [Consult Letter:] : I had the pleasure of evaluating your patient, [unfilled]. [Please see my note below.] : Please see my note below. [Consult Closing:] : Thank you very much for allowing me to participate in the care of this patient.  If you have any questions, please do not hesitate to contact me. [Sincerely,] : Sincerely, [FreeTextEntry2] : Aden Hughes MD ([place], [place]) [FreeTextEntry3] : Aquiles Campos MD, NENITA, FACS\par  Department Otolaryngology\par Director of [facility]\par Professor of Otolaryngology, \par [employer]

## 2022-12-14 NOTE — HISTORY OF PRESENT ILLNESS
[Headache] : headache [Periorbital Swelling] : periorbital swelling [de-identified] : 32 year old man, initial visit today for sinusitis.\par Reports associated symptoms of Right orbital infection with swelling - seen by Dr. Amador and was told sinus issues were contributing to eye symptoms - possible joint surgery if necessary.\par States symptoms present since April 2022 - hospitalized at [facility] for 4 days, given IV antibiotics at the time - discharged on oral antibiotics, Flonase and Polymyxin eye drops\par s/p CT Orbit 10/25/22 Impression: Right to Left nasal septal deviation and leftward directed septal spur as compromise Left nasal cavity. Carlota cells compromise each ostiomeatal complex, Left larger than Right. Sporadic pattern Right sided chronic sinusitis. Left orbital preseptal cellulitis. No abscess collection. No underlying acute sinusitis.\par \par Reports suffered from recurrent sinusitis and associated symptoms about 2 years ago.\par Currently denies nasal congestion, difficulty breathing, rhinorrhea, post nasal drip, sinus pain/pressure, anosmia, epistaxis or recent fevers.\par Used Flonase nasal spray as prescribed with moderate relief.\par No other imaging studies done.

## 2022-12-14 NOTE — DATA REVIEWED
[de-identified] : CT demonstrated nasal septal deviation septal spur comprising the airway Carlota cells comprised the ostiomeatal complex left greater than right sporadic right-sided sinusitis left preset toe cellulitis no abscess collection.\par No: Examined the patient and reviewing the CT scan personally the CT scan report may indeed be incorrect it appears that the patient has a right orbital preseptal cellulitis and left.  The patient's ethmoid disease is also on the right-hand side.

## 2022-12-14 NOTE — PHYSICAL EXAM
[Midline] : trachea located in midline position [Normal] : no rashes [FreeTextEntry1] : Left preseptal cellulitis [de-identified] : pus present

## 2022-12-21 ENCOUNTER — APPOINTMENT (OUTPATIENT)
Dept: CT IMAGING | Facility: IMAGING CENTER | Age: 32
End: 2022-12-21

## 2022-12-21 ENCOUNTER — OUTPATIENT (OUTPATIENT)
Dept: OUTPATIENT SERVICES | Facility: HOSPITAL | Age: 32
LOS: 1 days | End: 2022-12-21
Payer: COMMERCIAL

## 2022-12-21 DIAGNOSIS — J34.2 DEVIATED NASAL SEPTUM: ICD-10-CM

## 2022-12-21 PROCEDURE — 70486 CT MAXILLOFACIAL W/O DYE: CPT | Mod: 26

## 2022-12-21 PROCEDURE — 70486 CT MAXILLOFACIAL W/O DYE: CPT

## 2023-02-21 ENCOUNTER — OUTPATIENT (OUTPATIENT)
Dept: OUTPATIENT SERVICES | Facility: HOSPITAL | Age: 33
LOS: 1 days | End: 2023-02-21

## 2023-02-21 VITALS
DIASTOLIC BLOOD PRESSURE: 79 MMHG | TEMPERATURE: 97 F | RESPIRATION RATE: 16 BRPM | OXYGEN SATURATION: 99 % | SYSTOLIC BLOOD PRESSURE: 116 MMHG | WEIGHT: 138.01 LBS | HEIGHT: 68 IN | HEART RATE: 83 BPM

## 2023-02-21 DIAGNOSIS — H04.559 ACQUIRED STENOSIS OF UNSPECIFIED NASOLACRIMAL DUCT: ICD-10-CM

## 2023-02-21 DIAGNOSIS — H04.551 ACQUIRED STENOSIS OF RIGHT NASOLACRIMAL DUCT: ICD-10-CM

## 2023-02-21 LAB
HCT VFR BLD CALC: 44.5 % — SIGNIFICANT CHANGE UP (ref 39–50)
HGB BLD-MCNC: 14.1 G/DL — SIGNIFICANT CHANGE UP (ref 13–17)
MCHC RBC-ENTMCNC: 29.6 PG — SIGNIFICANT CHANGE UP (ref 27–34)
MCHC RBC-ENTMCNC: 31.7 GM/DL — LOW (ref 32–36)
MCV RBC AUTO: 93.3 FL — SIGNIFICANT CHANGE UP (ref 80–100)
NRBC # BLD: 0 /100 WBCS — SIGNIFICANT CHANGE UP (ref 0–0)
NRBC # FLD: 0 K/UL — SIGNIFICANT CHANGE UP (ref 0–0)
PLATELET # BLD AUTO: 228 K/UL — SIGNIFICANT CHANGE UP (ref 150–400)
RBC # BLD: 4.77 M/UL — SIGNIFICANT CHANGE UP (ref 4.2–5.8)
RBC # FLD: 11.6 % — SIGNIFICANT CHANGE UP (ref 10.3–14.5)
WBC # BLD: 6.4 K/UL — SIGNIFICANT CHANGE UP (ref 3.8–10.5)
WBC # FLD AUTO: 6.4 K/UL — SIGNIFICANT CHANGE UP (ref 3.8–10.5)

## 2023-02-21 RX ORDER — FLUTICASONE PROPIONATE 50 MCG
1 SPRAY, SUSPENSION NASAL
Qty: 0 | Refills: 0 | DISCHARGE

## 2023-02-21 RX ORDER — SODIUM CHLORIDE 0.65 %
2 AEROSOL, SPRAY (ML) NASAL
Qty: 0 | Refills: 0 | DISCHARGE

## 2023-02-21 NOTE — H&P PST ADULT - HISTORY OF PRESENT ILLNESS
Pt. is a 33 yo male with "swelling of the eyes in the hospital for 4 days in October."  Pt. has right lacrimal duct occlusion.  Pt. is a 31 yo male with "swelling of the eyes in the hospital for 4 days in October."  Pt. has right lacrimal duct stenosis.

## 2023-02-21 NOTE — H&P PST ADULT - ATTENDING COMMENTS
For right DCR with silastic tubes and mitomycin C.  Sinus surgery- Dr Campos.  Risks, benefits, options reviewed.  All questions answered.

## 2023-02-21 NOTE — H&P PST ADULT - NSANTHOSAYNRD_GEN_A_CORE
No. DMITRY screening performed.  STOP BANG Legend: 0-2 = LOW Risk; 3-4 = INTERMEDIATE Risk; 5-8 = HIGH Risk

## 2023-02-21 NOTE — H&P PST ADULT - PROBLEM SELECTOR PLAN 1
Pt. is scheduled for a right dacryoceptorhinostomy with ospina silastic tubing...3/2/23.  Pt. verbalized understanding of instructions.

## 2023-02-21 NOTE — H&P PST ADULT - NSICDXPASTMEDICALHX_GEN_ALL_CORE_FT
PAST MEDICAL HISTORY:  Deviated nasal septum      PAST MEDICAL HISTORY:  Deviated nasal septum     Lacrimal duct stenosis

## 2023-02-24 PROBLEM — H04.559 ACQUIRED STENOSIS OF UNSPECIFIED NASOLACRIMAL DUCT: Chronic | Status: ACTIVE | Noted: 2023-02-21

## 2023-03-01 ENCOUNTER — TRANSCRIPTION ENCOUNTER (OUTPATIENT)
Age: 33
End: 2023-03-01

## 2023-03-02 ENCOUNTER — APPOINTMENT (OUTPATIENT)
Dept: OTOLARYNGOLOGY | Facility: AMBULATORY SURGERY CENTER | Age: 33
End: 2023-03-02

## 2023-03-02 ENCOUNTER — TRANSCRIPTION ENCOUNTER (OUTPATIENT)
Age: 33
End: 2023-03-02

## 2023-03-02 ENCOUNTER — RESULT REVIEW (OUTPATIENT)
Age: 33
End: 2023-03-02

## 2023-03-02 ENCOUNTER — OUTPATIENT (OUTPATIENT)
Dept: OUTPATIENT SERVICES | Facility: HOSPITAL | Age: 33
LOS: 1 days | Discharge: HOME | End: 2023-03-02
Payer: COMMERCIAL

## 2023-03-02 VITALS
HEART RATE: 80 BPM | RESPIRATION RATE: 16 BRPM | OXYGEN SATURATION: 99 % | TEMPERATURE: 98 F | SYSTOLIC BLOOD PRESSURE: 130 MMHG

## 2023-03-02 VITALS
SYSTOLIC BLOOD PRESSURE: 110 MMHG | RESPIRATION RATE: 16 BRPM | TEMPERATURE: 99 F | HEIGHT: 68 IN | DIASTOLIC BLOOD PRESSURE: 73 MMHG | OXYGEN SATURATION: 99 % | WEIGHT: 138.01 LBS | HEART RATE: 83 BPM

## 2023-03-02 DIAGNOSIS — H04.551 ACQUIRED STENOSIS OF RIGHT NASOLACRIMAL DUCT: ICD-10-CM

## 2023-03-02 PROCEDURE — 30520 REPAIR OF NASAL SEPTUM: CPT

## 2023-03-02 PROCEDURE — 88311 DECALCIFY TISSUE: CPT | Mod: 26,59

## 2023-03-02 PROCEDURE — 61782 SCAN PROC CRANIAL EXTRA: CPT

## 2023-03-02 PROCEDURE — 88305 TISSUE EXAM BY PATHOLOGIST: CPT | Mod: 26,59

## 2023-03-02 PROCEDURE — 31267 ENDOSCOPY MAXILLARY SINUS: CPT | Mod: 50

## 2023-03-02 PROCEDURE — 31240 NSL/SNS NDSC CNCH BULL RESCJ: CPT | Mod: 50

## 2023-03-02 PROCEDURE — 31253 NSL/SINS NDSC TOTAL: CPT | Mod: 50

## 2023-03-02 PROCEDURE — 31288 NASAL/SINUS ENDOSCOPY SURG: CPT | Mod: 50

## 2023-03-02 PROCEDURE — 30130 EXCISE INFERIOR TURBINATE: CPT | Mod: 50

## 2023-03-02 PROCEDURE — 88304 TISSUE EXAM BY PATHOLOGIST: CPT | Mod: 26

## 2023-03-02 DEVICE — IMPLANTABLE DEVICE: Type: IMPLANTABLE DEVICE | Site: RIGHT | Status: FUNCTIONAL

## 2023-03-02 DEVICE — STENT SINUS DRUG ELUDING PROPEL CONTOUR: Type: IMPLANTABLE DEVICE | Site: RIGHT | Status: FUNCTIONAL

## 2023-03-02 DEVICE — SEEKR BLN EM FRNT 70 DEG 7X7MM: Type: IMPLANTABLE DEVICE | Site: RIGHT | Status: FUNCTIONAL

## 2023-03-02 DEVICE — STENT DRUG ELUTING SINUS BIO ABSORB INTERSECT ENT PROPEL 23MM: Type: IMPLANTABLE DEVICE | Site: RIGHT | Status: FUNCTIONAL

## 2023-03-02 RX ORDER — MITOMYCIN 5 MG/10ML
0.5 INJECTION, POWDER, LYOPHILIZED, FOR SOLUTION INTRAVENOUS ONCE
Refills: 0 | Status: DISCONTINUED | OUTPATIENT
Start: 2023-03-02 | End: 2023-03-16

## 2023-03-02 RX ORDER — OXYCODONE AND ACETAMINOPHEN 5; 325 MG/1; MG/1
1 TABLET ORAL
Qty: 20 | Refills: 0
Start: 2023-03-02

## 2023-03-02 RX ORDER — CEPHALEXIN 500 MG
1 CAPSULE ORAL
Qty: 20 | Refills: 0
Start: 2023-03-02 | End: 2023-03-11

## 2023-03-02 NOTE — ASU PREOPERATIVE ASSESSMENT, ADULT (IPARK ONLY) - FALL HARM RISK - UNIVERSAL INTERVENTIONS
Bed in lowest position, wheels locked, appropriate side rails in place/Call bell, personal items and telephone in reach/Instruct patient to call for assistance before getting out of bed or chair/Non-slip footwear when patient is out of bed/[place] to call system/Physically safe environment - no spills, clutter or unnecessary equipment/Purposeful Proactive Rounding/Room/bathroom lighting operational, light cord in reach

## 2023-03-02 NOTE — ASU PREOPERATIVE ASSESSMENT, ADULT (IPARK ONLY) - FALL HARM RISK - PT AGE POPULATION HIDDEN
Adult Alar Island Pedicle Flap Text: The defect edges were debeveled with a #15 scalpel blade.  Given the location of the defect, shape of the defect and the proximity to the alar rim an island pedicle advancement flap was deemed most appropriate.  Using a sterile surgical marker, an appropriate advancement flap was drawn incorporating the defect, outlining the appropriate donor tissue and placing the expected incisions within the nasal ala running parallel to the alar rim. The area thus outlined was incised with a #15 scalpel blade.  The skin margins were undermined minimally to an appropriate distance in all directions around the primary defect and laterally outward around the island pedicle utilizing iris scissors.  There was minimal undermining beneath the pedicle flap.

## 2023-03-03 ENCOUNTER — APPOINTMENT (OUTPATIENT)
Dept: OTOLARYNGOLOGY | Facility: CLINIC | Age: 33
End: 2023-03-03
Payer: COMMERCIAL

## 2023-03-03 LAB
GRAM STN FLD: SIGNIFICANT CHANGE UP
NIGHT BLUE STAIN TISS: SIGNIFICANT CHANGE UP
SPECIMEN SOURCE: SIGNIFICANT CHANGE UP
SPECIMEN SOURCE: SIGNIFICANT CHANGE UP

## 2023-03-03 PROCEDURE — 99024 POSTOP FOLLOW-UP VISIT: CPT

## 2023-03-03 NOTE — REASON FOR VISIT
[Post-Operative Visit] : a post-operative visit [FreeTextEntry2] : nasal packing removal-s/p septoplasty turbinectomy ct guided maxillary sinus surgery on 3/2/2023

## 2023-03-03 NOTE — HISTORY OF PRESENT ILLNESS
[None] : No associated symptoms are reported. [de-identified] : Mr. Shearer is accompanied by his wife. He  s/p septoplasty turbinectomy ct guided maxillary sinus surgery on 3/2/2023. Presents today for nasal packing removal. Reports feeling well over night. Taking tylenol for pain control. He is on  Medrol dose pack and cephalexin 500 mg PO every 12 hours.

## 2023-03-04 LAB
-  AMIKACIN: SIGNIFICANT CHANGE UP
-  AMIKACIN: SIGNIFICANT CHANGE UP
-  AMOXICILLIN/CLAVULANIC ACID: SIGNIFICANT CHANGE UP
-  AMOXICILLIN/CLAVULANIC ACID: SIGNIFICANT CHANGE UP
-  AMPICILLIN/SULBACTAM: SIGNIFICANT CHANGE UP
-  AMPICILLIN/SULBACTAM: SIGNIFICANT CHANGE UP
-  AMPICILLIN: SIGNIFICANT CHANGE UP
-  AMPICILLIN: SIGNIFICANT CHANGE UP
-  AZTREONAM: SIGNIFICANT CHANGE UP
-  AZTREONAM: SIGNIFICANT CHANGE UP
-  CEFAZOLIN: SIGNIFICANT CHANGE UP
-  CEFAZOLIN: SIGNIFICANT CHANGE UP
-  CEFEPIME: SIGNIFICANT CHANGE UP
-  CEFEPIME: SIGNIFICANT CHANGE UP
-  CEFOXITIN: SIGNIFICANT CHANGE UP
-  CEFOXITIN: SIGNIFICANT CHANGE UP
-  CEFTAZIDIME/AVIBACTAM: SIGNIFICANT CHANGE UP
-  CEFTOLOZANE/TAZOBACTAM: SIGNIFICANT CHANGE UP
-  CEFTRIAXONE: SIGNIFICANT CHANGE UP
-  CEFTRIAXONE: SIGNIFICANT CHANGE UP
-  CIPROFLOXACIN: SIGNIFICANT CHANGE UP
-  CIPROFLOXACIN: SIGNIFICANT CHANGE UP
-  ERTAPENEM: SIGNIFICANT CHANGE UP
-  ERTAPENEM: SIGNIFICANT CHANGE UP
-  GENTAMICIN: SIGNIFICANT CHANGE UP
-  GENTAMICIN: SIGNIFICANT CHANGE UP
-  IMIPENEM: SIGNIFICANT CHANGE UP
-  IMIPENEM: SIGNIFICANT CHANGE UP
-  LEVOFLOXACIN: SIGNIFICANT CHANGE UP
-  LEVOFLOXACIN: SIGNIFICANT CHANGE UP
-  MEROPENEM: SIGNIFICANT CHANGE UP
-  MEROPENEM: SIGNIFICANT CHANGE UP
-  PIPERACILLIN/TAZOBACTAM: SIGNIFICANT CHANGE UP
-  PIPERACILLIN/TAZOBACTAM: SIGNIFICANT CHANGE UP
-  TOBRAMYCIN: SIGNIFICANT CHANGE UP
-  TOBRAMYCIN: SIGNIFICANT CHANGE UP
-  TRIMETHOPRIM/SULFAMETHOXAZOLE: SIGNIFICANT CHANGE UP
-  TRIMETHOPRIM/SULFAMETHOXAZOLE: SIGNIFICANT CHANGE UP
METHOD TYPE: SIGNIFICANT CHANGE UP
METHOD TYPE: SIGNIFICANT CHANGE UP

## 2023-03-05 LAB
-  AMIKACIN: SIGNIFICANT CHANGE UP
-  AZTREONAM: SIGNIFICANT CHANGE UP
-  CEFEPIME: SIGNIFICANT CHANGE UP
-  CEFTAZIDIME: SIGNIFICANT CHANGE UP
-  CIPROFLOXACIN: SIGNIFICANT CHANGE UP
-  GENTAMICIN: SIGNIFICANT CHANGE UP
-  IMIPENEM: SIGNIFICANT CHANGE UP
-  LEVOFLOXACIN: SIGNIFICANT CHANGE UP
-  MEROPENEM: SIGNIFICANT CHANGE UP
-  PIPERACILLIN/TAZOBACTAM: SIGNIFICANT CHANGE UP
-  TOBRAMYCIN: SIGNIFICANT CHANGE UP
CULTURE RESULTS: SIGNIFICANT CHANGE UP
METHOD TYPE: SIGNIFICANT CHANGE UP
ORGANISM # SPEC MICROSCOPIC CNT: SIGNIFICANT CHANGE UP
SPECIMEN SOURCE: SIGNIFICANT CHANGE UP

## 2023-03-06 LAB — SURGICAL PATHOLOGY STUDY: SIGNIFICANT CHANGE UP

## 2023-03-10 ENCOUNTER — APPOINTMENT (OUTPATIENT)
Dept: OTOLARYNGOLOGY | Facility: CLINIC | Age: 33
End: 2023-03-10
Payer: COMMERCIAL

## 2023-03-10 PROCEDURE — 99024 POSTOP FOLLOW-UP VISIT: CPT

## 2023-03-10 NOTE — REASON FOR VISIT
[Post-Operative Visit] : a post-operative visit [FreeTextEntry2] : splint removal [FreeTextEntry1] : s/p septoplasty and turbinectomy s/p Endoscopic Sinus Surgery DCR placement by Dr Amador

## 2023-03-10 NOTE — HISTORY OF PRESENT ILLNESS
[de-identified] : Pt is healing well. Pt admits to using saline diligently throughout the week. Pt has moderate nasal congestion and mild pain.\par

## 2023-03-15 LAB — SURGICAL PATHOLOGY STUDY: SIGNIFICANT CHANGE UP

## 2023-04-01 LAB
CULTURE RESULTS: SIGNIFICANT CHANGE UP
SPECIMEN SOURCE: SIGNIFICANT CHANGE UP

## 2023-04-11 NOTE — PROGRESS NOTE ADULT - SUBJECTIVE AND OBJECTIVE BOX
F/U ER visit. Patient called and scheduled appt with pcp on 4/6/18 at 12:25pm. Please add to schedule.   Patient Contact: [phone]  Date of Visit: 4/3/18   Reason: Anxiety; Schizophrenia, unspecified type (CMS/HCC  Urgency of follow up: As Needed   Hospital: [place]    [facility] DEPARTMENT OF OPHTHALMOLOGY  ------------------------------------------------------------------------------  Octaviano Lance MD PGY 3  [phone]  ------------------------------------------------------------------------------    Interval History: No acute events overnight. Reports improvement in swelling and pain. Following for preseptal cellulitis and dacryo    MEDICATIONS  (STANDING):  ampicillin/sulbactam  IVPB      ampicillin/sulbactam  IVPB 3 Gram(s) IV Intermittent every 6 hours  dexamethasone/neomycin/polymyxin Suspension 1 Drop(s) Right EYE four times a day  enoxaparin Injectable 40 milliGRAM(s) SubCutaneous every 24 hours  fluticasone propionate 50 MICROgram(s)/spray Nasal Spray 1 Spray(s) Both Nostrils two times a day  oxymetazoline 0.05% Nasal Spray 1 Spray(s) Both Nostrils two times a day  vancomycin  IVPB 1000 milliGRAM(s) IV Intermittent every 12 hours    MEDICATIONS  (PRN):      VITALS: T(C): 36.5 (10-25-22 @ 06:38)  T(F): 97.7 (10-25-22 @ 06:38), Max: 98.1 (10-24-22 @ 18:02)  HR: 90 (10-25-22 @ 06:38) (90 - 104)  BP: 117/70 (10-25-22 @ 06:38) (117/70 - 129/78)  RR:  (18 - 18)  SpO2:  (98% - 100%)  Wt(kg): --  General: AAO x 3, appropriate mood and affect    Ophthalmology Exam:  Visual acuity (sc): 20/20 OD, 20/20 OS  Pupils: PERRL OU, no APD  Ttono: 19 OD, 22 OS  Extraocular movements (EOMs): Grossly full OD possibly 10-20% restriction on abduction OD; full OS      Pen Light Exam (PLE)  External: 3+ periorbital edema and 2+ erythema with tenderness to palpation OD; Flat OS; no proptosis, soft lid edema, no RTR  Lids/Lashes/Lacrimal Ducts: 3+ periorbital edema and 2+ erythema with tenderness to palpation OD; Flat OS; no discharge expressed from punctum; nodule at medial meatus exquisitely tender to palpation  Sclera/Conjunctiva: 1+ temporal chemosis OD; W+Q OS  Cornea: Cl OU  Anterior Chamber: D+F OU    Iris: Flat OU  Lens: Cl OU   [facility] DEPARTMENT OF OPHTHALMOLOGY  ------------------------------------------------------------------------------  Octaviano Lance MD PGY 3  [phone]  ------------------------------------------------------------------------------    Interval History: No acute events overnight. Reports improvement in swelling and pain. Following for preseptal cellulitis and dacryo    MEDICATIONS  (STANDING):  ampicillin/sulbactam  IVPB      ampicillin/sulbactam  IVPB 3 Gram(s) IV Intermittent every 6 hours  dexamethasone/neomycin/polymyxin Suspension 1 Drop(s) Right EYE four times a day  enoxaparin Injectable 40 milliGRAM(s) SubCutaneous every 24 hours  fluticasone propionate 50 MICROgram(s)/spray Nasal Spray 1 Spray(s) Both Nostrils two times a day  oxymetazoline 0.05% Nasal Spray 1 Spray(s) Both Nostrils two times a day  vancomycin  IVPB 1000 milliGRAM(s) IV Intermittent every 12 hours    MEDICATIONS  (PRN):      VITALS: T(C): 36.5 (10-25-22 @ 06:38)  T(F): 97.7 (10-25-22 @ 06:38), Max: 98.1 (10-24-22 @ 18:02)  HR: 90 (10-25-22 @ 06:38) (90 - 104)  BP: 117/70 (10-25-22 @ 06:38) (117/70 - 129/78)  RR:  (18 - 18)  SpO2:  (98% - 100%)  Wt(kg): --  General: AAO x 3, appropriate mood and affect    Ophthalmology Exam:  Visual acuity (sc): 20/20 OD, 20/20 OS  Pupils: PERRL OU, no APD  Ttono: 22 OD, 19 OS  Extraocular movements (EOMs): Grossly full OD possibly 10-20% restriction on abduction OD; full OS      Pen Light Exam (PLE)  External: 3+ periorbital edema and 2+ erythema with tenderness to palpation OD; Flat OS; no proptosis, soft lid edema, no RTR  Lids/Lashes/Lacrimal Ducts: 3+ periorbital edema and 2+ erythema with tenderness to palpation OD; Flat OS; no discharge expressed from punctum; nodule at medial meatus exquisitely tender to palpation  Sclera/Conjunctiva: 1+ temporal chemosis OD; W+Q OS  Cornea: Cl OU  Anterior Chamber: D+F OU    Iris: Flat OU  Lens: Cl OU   [Other: ____] : [unfilled]

## 2023-04-19 LAB
CULTURE RESULTS: SIGNIFICANT CHANGE UP
SPECIMEN SOURCE: SIGNIFICANT CHANGE UP

## 2023-05-10 ENCOUNTER — APPOINTMENT (OUTPATIENT)
Dept: OTOLARYNGOLOGY | Facility: CLINIC | Age: 33
End: 2023-05-10
Payer: COMMERCIAL

## 2023-05-10 VITALS
HEART RATE: 82 BPM | HEIGHT: 68 IN | WEIGHT: 136.68 LBS | BODY MASS INDEX: 20.72 KG/M2 | DIASTOLIC BLOOD PRESSURE: 69 MMHG | SYSTOLIC BLOOD PRESSURE: 109 MMHG

## 2023-05-10 DIAGNOSIS — H91.93 UNSPECIFIED HEARING LOSS, BILATERAL: ICD-10-CM

## 2023-05-10 PROCEDURE — 92557 COMPREHENSIVE HEARING TEST: CPT

## 2023-05-10 PROCEDURE — 92567 TYMPANOMETRY: CPT

## 2023-05-10 PROCEDURE — 99024 POSTOP FOLLOW-UP VISIT: CPT | Mod: 25

## 2023-05-10 PROCEDURE — 99214 OFFICE O/P EST MOD 30 MIN: CPT | Mod: 25

## 2023-05-10 RX ORDER — AMOXICILLIN AND CLAVULANATE POTASSIUM 875; 125 MG/1; MG/1
875-125 TABLET, COATED ORAL
Qty: 20 | Refills: 1 | Status: COMPLETED | COMMUNITY
Start: 2022-12-14 | End: 2023-05-10

## 2023-05-10 RX ORDER — FLUTICASONE PROPIONATE 50 UG/1
50 SPRAY, METERED NASAL DAILY
Qty: 1 | Refills: 5 | Status: COMPLETED | COMMUNITY
Start: 2022-12-14 | End: 2023-05-10

## 2023-05-10 RX ORDER — MUPIROCIN 20 MG/G
2 OINTMENT TOPICAL TWICE DAILY
Qty: 1 | Refills: 3 | Status: COMPLETED | COMMUNITY
Start: 2023-03-28 | End: 2023-05-10

## 2023-05-10 NOTE — REASON FOR VISIT
[Subsequent Evaluation] : a subsequent evaluation for [FreeTextEntry2] : follow up s/p  Septoplasty, turbinectomy, bilateral endoscopic CTguided ethmoidectomy, sphenoidectomy, maxillary antrostomy with sinus curettage, frontal sinus exploration and bilateral middle turbinate asael bullosa resection, and right dacryocystorhinostomy (DCR)  by Dr. Nitza Amador 3/2/23

## 2023-05-10 NOTE — PHYSICAL EXAM
[Midline] : trachea located in midline position [Normal] : no rashes [FreeTextEntry1] : Left preseptal cellulitis [de-identified] : fluid and bubbles in R middle ear

## 2023-05-10 NOTE — CONSULT LETTER
[Dear  ___] : Dear  [unfilled], [Courtesy Letter:] : I had the pleasure of seeing your patient, [unfilled], in my office today. [Please see my note below.] : Please see my note below. [Sincerely,] : Sincerely, [FreeTextEntry2] : Aden Huhges MD ([place], [place])  [FreeTextEntry3] : Aquiles Campos MD, NENITA, FACS\par  Department Otolaryngology\par Director of [facility]\par Professor of Otolaryngology, \par [facility]/\[facility]\"" [employer]\par

## 2023-05-10 NOTE — PROCEDURE
[Image(s) Captured] : image(s) captured and filed [Video Captured] : video captured and filed [Topical Lidocaine] : topical lidocaine [Oxymetazoline HCl] : oxymetazoline HCl [Flexible Endoscope] : examined with the flexible endoscope [Serial Number: ___] : Serial Number: [unfilled] [Anterior rhinoscopy insufficient to account for symptoms] : anterior rhinoscopy insufficient to account for symptoms [Congested] : congested [Normal] : the middle meatus had no abnormalities [Moderate] : moderate [Rt] : on the right [FreeTextEntry1] : transnasal removal of DCR stent placed by Dr. Amador [FreeTextEntry2] : DCR stent in R eye with crusting and congestion [FreeTextEntry3] : Pt was seated in a partially reclined position in exam chair. R nasal passage was debrided of crust, decongested with cotton pledgets and lidocaine and Afrin spray. Stent was cut between the eyelids and removed with biopsy forceps transnasal.  [FreeTextEntry6] : Pre-op indication(s): s/p septoplasty turbinectomy, FESS, R dacryocystorhinostomy\par Post-op indication(s): swollen tissue R side with congestion and crusting\par Verbal consent obtained from patient.\par “Anterior rhinoscopy insufficient to account for symptoms” \par Details for procedure: \par Scope #: [id number]\par Type of scope:    flexible fiber optic telescope  X   Rigid glass telescope \par Anesthesia and/or vasoconstriction was achieved topically by using: \par 4% Lidocaine spray   0.05% Oxymetazoline     Other ______ \par The following anatomic sites were directly examined in a sequential fashion: \par The scope was introduced in the nasal passage between the middle and inferior turbinates to exam the inferior portion of the middle meatus and the fontanelle, as well as the maxillary ostia. Next, the scope was passed medially and posteriorly to the middle turbinates to examine the sphenoethmoid recess and the superior turbinate region. \par Upon visualization the finders are as follows: \par Nasal Septum:   Normal   \par Bleeding site cauterized:    Anterior   left   right   Posterior   left   right \par Method:   Silver Nitrate   YAG Laser    Electrocautery ______ \par Right Side: \par * Mucosa: Normal\par * Mucous: Normal\par * Polyp: Normal\par * Inferior Turbinate: Normal\par * Middle Turbinate: Normal\par * Superior Turbinate: Normal\par * Inferior Meatus: Normal\par * Middle Meatus: Normal\par * Super Meatus: Normal\par * Sphenoethmoidal Recess: Normal\par Left Side: \par * Mucosa: Normal\par * Mucous: Normal\par * Polyp: Normal\par * Inferior Turbinate: Normal\par * Middle Turbinate: Normal\par * Superior Turbinate: Normal\par * Inferior Meatus: Normal\par * Middle Meatus: Normal\par * Super Meatus: Normal\par * Sphenoethmoidal Recess: Normal\par The patient tolerated the procedure well without any complications.\par \par   [FreeTextEntry5] : n

## 2023-05-10 NOTE — HISTORY OF PRESENT ILLNESS
[de-identified] : 33 year old male follow up s/p  Septoplasty, turbinectomy, bilateral endoscopic CTguided ethmoidectomy, sphenoidectomy, maxillary antrostomy with sinus curettage, frontal sinus exploration and bilateral middle turbinate asael bullosa resection, and right dacryocystorhinostomy (DCR)  by Dr. Nitza Amador 3/2/23.\par States using NeilMed sinus rinse twice a day.  States having some nasal congestion.  States having headaches, feels it is due to lack of oxygen when wearing a mask.

## 2023-10-03 ENCOUNTER — NON-APPOINTMENT (OUTPATIENT)
Age: 33
End: 2023-10-03

## 2023-10-04 ENCOUNTER — APPOINTMENT (OUTPATIENT)
Dept: OTOLARYNGOLOGY | Facility: CLINIC | Age: 33
End: 2023-10-04
Payer: COMMERCIAL

## 2023-10-04 VITALS
HEART RATE: 73 BPM | WEIGHT: 134.48 LBS | HEIGHT: 68 IN | SYSTOLIC BLOOD PRESSURE: 117 MMHG | BODY MASS INDEX: 20.38 KG/M2 | DIASTOLIC BLOOD PRESSURE: 66 MMHG

## 2023-10-04 DIAGNOSIS — R09.81 NASAL CONGESTION: ICD-10-CM

## 2023-10-04 DIAGNOSIS — R51.9 HEADACHE, UNSPECIFIED: ICD-10-CM

## 2023-10-04 DIAGNOSIS — J34.2 DEVIATED NASAL SEPTUM: ICD-10-CM

## 2023-10-04 PROCEDURE — 99214 OFFICE O/P EST MOD 30 MIN: CPT | Mod: 25

## 2023-10-04 PROCEDURE — 31231 NASAL ENDOSCOPY DX: CPT

## 2023-10-04 RX ORDER — METHYLPREDNISOLONE 4 MG/1
4 TABLET ORAL
Qty: 1 | Refills: 0 | Status: COMPLETED | COMMUNITY
Start: 2023-05-10 | End: 2023-10-04

## 2023-10-12 ENCOUNTER — APPOINTMENT (OUTPATIENT)
Dept: CT IMAGING | Facility: IMAGING CENTER | Age: 33
End: 2023-10-12
Payer: COMMERCIAL

## 2023-10-12 ENCOUNTER — OUTPATIENT (OUTPATIENT)
Dept: OUTPATIENT SERVICES | Facility: HOSPITAL | Age: 33
LOS: 1 days | End: 2023-10-12
Payer: COMMERCIAL

## 2023-10-12 DIAGNOSIS — Z00.8 ENCOUNTER FOR OTHER GENERAL EXAMINATION: ICD-10-CM

## 2023-10-12 DIAGNOSIS — R09.81 NASAL CONGESTION: ICD-10-CM

## 2023-10-12 DIAGNOSIS — J32.0 CHRONIC MAXILLARY SINUSITIS: ICD-10-CM

## 2023-10-12 PROCEDURE — 70486 CT MAXILLOFACIAL W/O DYE: CPT

## 2023-10-12 PROCEDURE — 70486 CT MAXILLOFACIAL W/O DYE: CPT | Mod: 26

## 2024-03-18 ENCOUNTER — NON-APPOINTMENT (OUTPATIENT)
Age: 34
End: 2024-03-18

## 2024-03-18 ENCOUNTER — LABORATORY RESULT (OUTPATIENT)
Age: 34
End: 2024-03-18

## 2024-03-18 ENCOUNTER — APPOINTMENT (OUTPATIENT)
Dept: PEDIATRIC ALLERGY IMMUNOLOGY | Facility: CLINIC | Age: 34
End: 2024-03-18
Payer: COMMERCIAL

## 2024-03-18 VITALS
HEIGHT: 68 IN | BODY MASS INDEX: 21.39 KG/M2 | HEART RATE: 73 BPM | DIASTOLIC BLOOD PRESSURE: 83 MMHG | OXYGEN SATURATION: 98 % | SYSTOLIC BLOOD PRESSURE: 126 MMHG | WEIGHT: 141.13 LBS

## 2024-03-18 DIAGNOSIS — R06.02 SHORTNESS OF BREATH: ICD-10-CM

## 2024-03-18 DIAGNOSIS — Z13.228 ENCOUNTER FOR SCREENING FOR OTHER SUSPECTED ENDOCRINE DISORDER: ICD-10-CM

## 2024-03-18 DIAGNOSIS — Z13.0 ENCOUNTER FOR SCREENING FOR OTHER SUSPECTED ENDOCRINE DISORDER: ICD-10-CM

## 2024-03-18 DIAGNOSIS — Z13.29 ENCOUNTER FOR SCREENING FOR OTHER SUSPECTED ENDOCRINE DISORDER: ICD-10-CM

## 2024-03-18 PROCEDURE — 94010 BREATHING CAPACITY TEST: CPT

## 2024-03-18 PROCEDURE — 95012 NITRIC OXIDE EXP GAS DETER: CPT

## 2024-03-18 PROCEDURE — 36415 COLL VENOUS BLD VENIPUNCTURE: CPT

## 2024-03-18 PROCEDURE — 95004 PERQ TESTS W/ALRGNC XTRCS: CPT

## 2024-03-18 PROCEDURE — 99204 OFFICE O/P NEW MOD 45 MIN: CPT | Mod: 25

## 2024-03-18 RX ORDER — BUDESONIDE 0.5 MG/2ML
0.5 INHALANT ORAL TWICE DAILY
Qty: 60 | Refills: 3 | Status: ACTIVE | COMMUNITY
Start: 2023-05-10 | End: 1900-01-01

## 2024-03-18 NOTE — IMPRESSION
[Allergy Testing Dust Mite] : dust mites [Normal Spirometry] : spirometry normal [Allergy Testing Mixed Feathers] : feathers [Allergy Testing Cockroach] : cockroach [Allergy Testing Dog] : dog [Allergy Testing Cat] : cat [] : molds [Allergy Testing Weeds] : weeds [Allergy Testing Trees] : trees [Allergy Testing Grasses] : grasses

## 2024-03-20 LAB
A ALTERNATA IGE QN: <0.1 KUA/L
A FUMIGATUS IGE QN: <0.1 KUA/L
ALBUMIN MFR SERPL ELPH: 59.9 %
ALBUMIN SERPL ELPH-MCNC: 4.8 G/DL
ALBUMIN SERPL-MCNC: 4.4 G/DL
ALBUMIN/GLOB SERPL: 1.5 RATIO
ALP BLD-CCNC: 89 U/L
ALPHA1 GLOB MFR SERPL ELPH: 3.4 %
ALPHA1 GLOB SERPL ELPH-MCNC: 0.3 G/DL
ALPHA2 GLOB MFR SERPL ELPH: 7.9 %
ALPHA2 GLOB SERPL ELPH-MCNC: 0.6 G/DL
ALT SERPL-CCNC: 20 U/L
ANION GAP SERPL CALC-SCNC: 11 MMOL/L
AST SERPL-CCNC: 19 U/L
B-GLOBULIN MFR SERPL ELPH: 11 %
B-GLOBULIN SERPL ELPH-MCNC: 0.8 G/DL
BASOPHILS # BLD AUTO: 0.08 K/UL
BASOPHILS NFR BLD AUTO: 0.9 %
BILIRUB SERPL-MCNC: 0.4 MG/DL
BUN SERPL-MCNC: 13 MG/DL
C HERBARUM IGE QN: <0.1 KUA/L
C LUNATA IGE QN: <0.1 KUA/L
CALCIUM SERPL-MCNC: 9.9 MG/DL
CH50 SERPL-MCNC: 27 U/ML
CHLORIDE SERPL-SCNC: 101 MMOL/L
CO2 SERPL-SCNC: 28 MMOL/L
CREAT SERPL-MCNC: 0.83 MG/DL
DEPRECATED A ALTERNATA IGE RAST QL: 0 (ref 0–?)
DEPRECATED A FUMIGATUS IGE RAST QL: 0 (ref 0–?)
DEPRECATED A PULLULANS IGE RAST QL: 0
DEPRECATED C HERBARUM IGE RAST QL: 0 (ref 0–?)
DEPRECATED C LUNATA IGE RAST QL: 0
DEPRECATED F MONILIFORME IGE RAST QL: 0
DEPRECATED KAPPA LC FREE/LAMBDA SER: 1.51 RATIO
DEPRECATED M RACEMOSUS IGE RAST QL: 0
DEPRECATED P NOTATUM IGE RAST QL: 0 (ref 0–?)
DEPRECATED R NIGRICANS IGE RAST QL: 0
EGFR: 119 ML/MIN/1.73M2
EOSINOPHIL # BLD AUTO: 0.26 K/UL
EOSINOPHIL NFR BLD AUTO: 2.9 %
F MONILIFORME IGE QN: <0.1 KUA/L
GAMMA GLOB FLD ELPH-MCNC: 1.3 G/DL
GAMMA GLOB MFR SERPL ELPH: 17.8 %
GLUCOSE SERPL-MCNC: 84 MG/DL
HCT VFR BLD CALC: 45 %
HGB BLD-MCNC: 14.6 G/DL
IGA SER QL IEP: 335 MG/DL
IGG SER QL IEP: 1403 MG/DL
IGM SER QL IEP: 65 MG/DL
IMM GRANULOCYTES NFR BLD AUTO: 0.3 %
INTERPRETATION SERPL IEP-IMP: NORMAL
KAPPA LC CSF-MCNC: 1.31 MG/DL
KAPPA LC SERPL-MCNC: 1.98 MG/DL
LYMPHOCYTES # BLD AUTO: 2.42 K/UL
LYMPHOCYTES NFR BLD AUTO: 26.7 %
M PROTEIN SPEC IFE-MCNC: NORMAL
M RACEMOSUS IGE QN: <0.1 KUA/L
MAN DIFF?: NORMAL
MCHC RBC-ENTMCNC: 30.6 PG
MCHC RBC-ENTMCNC: 32.4 GM/DL
MCV RBC AUTO: 94.3 FL
MOLD (AUREOBASIDIUM M12) CONC: <0.1 KUA/L
MONOCYTES # BLD AUTO: 0.84 K/UL
MONOCYTES NFR BLD AUTO: 9.3 %
NEUTROPHILS # BLD AUTO: 5.42 K/UL
NEUTROPHILS NFR BLD AUTO: 59.9 %
P NOTATUM IGE QN: <0.1 KUA/L
PLATELET # BLD AUTO: 256 K/UL
POTASSIUM SERPL-SCNC: 4.3 MMOL/L
PROT SERPL-MCNC: 7.4 G/DL
R NIGRICANS IGE QN: <0.1 KUA/L
RBC # BLD: 4.77 M/UL
RBC # FLD: 12 %
SODIUM SERPL-SCNC: 140 MMOL/L
TOTAL IGE SMQN RAST: 113 KU/L
TRYPTASE: 4.7 UG/L
WBC # FLD AUTO: 9.05 K/UL

## 2024-03-20 NOTE — PHYSICAL EXAM
[Alert] : alert [Well Nourished] : well nourished [Healthy Appearance] : healthy appearance [No Acute Distress] : no acute distress [Normal Pupil & Iris Size/Symmetry] : normal pupil and iris size and symmetry [Well Developed] : well developed [No Photophobia] : no photophobia [No Discharge] : no discharge [Sclera Not Icteric] : sclera not icteric [Normal TMs] : both tympanic membranes were normal [Normal Nasal Mucosa] : the nasal mucosa was normal [Normal Lips/Tongue] : the lips and tongue were normal [Normal Outer Ear/Nose] : the ears and nose were normal in appearance [Normal Tonsils] : normal tonsils [No Thrush] : no thrush [Pale mucosa] : pale mucosa [Boggy Nasal Turbinates] : boggy and/or pale nasal turbinates [Posterior Pharyngeal Cobblestoning] : posterior pharyngeal cobblestoning [Supple] : the neck was supple [No Crackles] : no crackles [Normal Rate and Effort] : normal respiratory rhythm and effort [No Retractions] : no retractions [Bilateral Audible Breath Sounds] : bilateral audible breath sounds [Normal Rate] : heart rate was normal  [Normal S1, S2] : normal S1 and S2 [No murmur] : no murmur [Regular Rhythm] : with a regular rhythm [Not Tender] : non-tender [Soft] : abdomen soft [Not Distended] : not distended [No HSM] : no hepato-splenomegaly [Normal Cervical Lymph Nodes] : cervical [Skin Intact] : skin intact  [No Skin Lesions] : no skin lesions [No Rash] : no rash [No clubbing] : no clubbing [No Edema] : no edema [Normal Mood] : mood was normal [No Cyanosis] : no cyanosis [Alert, Awake, Oriented as Age-Appropriate] : alert, awake, oriented as age appropriate [Normal Affect] : affect was normal [Conjunctival Erythema] : no conjunctival erythema [Suborbital Bogginess] : no suborbital bogginess (allergic shiners) [Pharyngeal erythema] : no pharyngeal erythema [Clear Rhinorrhea] : no clear rhinorrhea was seen [Exudate] : no exudate [Wheezing] : no wheezing was heard [Patches] : no patches [Urticaria] : no urticaria [Dermatographism] : no dermatographism

## 2024-03-20 NOTE — REASON FOR VISIT
No [Allergy Evaluation/ Skin Testing] : allergy evaluation and or skin testing [Initial Consultation] : an initial consultation for [FreeTextEntry2] : Chronic Rhinosinusitis with Nasal Polyps

## 2024-03-20 NOTE — SOCIAL HISTORY
[Central Forced Air] : heating provided by central forced air [House] : [unfilled] lives in a house  [Central] : air conditioning provided by central unit [Dry] : dry [None] : none [Humidifier] : does not use a humidifier [Dehumidifier] : does not use a dehumidifier [Cockroaches] : Patient states that there are no cockroaches in the home [Dust Mite Covers] : does not have dust mite covers [Feather Pillows] : does not have feather pillows [Feather Comforter] : does not have a feather comforter [Bedroom] : not in the bedroom [Basement] : not in the basement [Living Area] : not in the living area [Smokers in Household] : there are no smokers in the home

## 2024-03-20 NOTE — END OF VISIT
[] : Fellow [FreeTextEntry3] : 32 yo male with recurrent Chronic Rhinosinusitis with Nasal Polyps, s/p 1 ESS, no history of asthma or NSAID allergy

## 2024-03-20 NOTE — HISTORY OF PRESENT ILLNESS
[Asthma] : asthma [Eczematous rashes] : eczematous rashes [Venom Reactions] : venom reactions [Drug Allergies] : drug allergies [Food Allergies] : food allergies [de-identified] : PANDA ROMANO  is a 33 year old male who presents for evaluation of chronic rhinosinusitis. Patient was referred by Dr Campos .   CHRONIC RHINOSINUSITIS Nasal polyposis- Yes Patient has had 1 sinus surgeries. Last surgery was in 3/2/2023. Over the last year he received 1 courses of antibiotics and steroids. His sense of smell is and sense of taste are fine. Nasal steroids: Budesonide, takes about 2 /week   There is no history of ASTHMA. He does have exertional SOB that he attributes to wearing a mask   No history of NSAIDs reactions. He hasnt taken them in years.   History of respiratory symptoms after alcohol ingestion: he doesnt drink for Restorationism reasons   History of hives: No   History of GI bleeding or ulcers: No   Race/Ethnicity:     - Nasal endoscopy by Dr Dr Campos 10/2023- allergic swollen tissue bilaterally with polyp formation. - 3/2/2023- 1st sinus surgery, bilateral septoplasty w/turbinectomy - path- polyp, scattered eosinophils  Previously had bad nasal congestion that is improved after surgery Starting to have a little increased secretions on the right side, increased congestion on the left side saline and budesonide sinus rinses are flushing out secretions - doing 2x a day  Symptoms are worst during winter - exposure to cold weather   PMHx: preseptal cellulitis, deviated septum

## 2024-03-20 NOTE — CONSULT LETTER
[Dear  ___] : Dear  [unfilled], [Consult Letter:] : I had the pleasure of evaluating your patient, [unfilled]. [Please see my note below.] : Please see my note below. [Consult Closing:] : Thank you very much for allowing me to participate in the care of this patient.  If you have any questions, please do not hesitate to contact me. [Sincerely,] : Sincerely, [FreeTextEntry3] : MD Reina Bowen MD [facility] [address] [place] [id number] phone: [phone] fax: [phone]

## 2024-03-22 LAB — MANNAN BINDING LECTIN (MBL): 1809 NG/ML

## 2024-03-27 LAB
2,3-DINOR-11B-PROSTAGLANDIN F2A, RANDOM URINE: 1196 PG/MG CR
COMPLEMENT, ALTERNATE PATHWAY (AH50): 95
CREATININE RANDOM URINE: 56 MG/DL
CREATININE RANDOM URINE: 56 MG/DL
LEUKOTRIENE E4, URINE: 241 PG/MG CR

## 2024-04-15 ENCOUNTER — APPOINTMENT (OUTPATIENT)
Dept: PEDIATRIC ALLERGY IMMUNOLOGY | Facility: CLINIC | Age: 34
End: 2024-04-15

## 2024-04-22 ENCOUNTER — APPOINTMENT (OUTPATIENT)
Dept: PEDIATRIC ALLERGY IMMUNOLOGY | Facility: CLINIC | Age: 34
End: 2024-04-22
Payer: COMMERCIAL

## 2024-04-22 ENCOUNTER — APPOINTMENT (OUTPATIENT)
Dept: PEDIATRIC ALLERGY IMMUNOLOGY | Facility: CLINIC | Age: 34
End: 2024-04-22

## 2024-04-22 ENCOUNTER — NON-APPOINTMENT (OUTPATIENT)
Age: 34
End: 2024-04-22

## 2024-04-22 VITALS
HEIGHT: 68 IN | BODY MASS INDEX: 21.54 KG/M2 | HEART RATE: 99 BPM | DIASTOLIC BLOOD PRESSURE: 75 MMHG | SYSTOLIC BLOOD PRESSURE: 121 MMHG | WEIGHT: 142.13 LBS | OXYGEN SATURATION: 98 %

## 2024-04-22 DIAGNOSIS — R05.9 COUGH, UNSPECIFIED: ICD-10-CM

## 2024-04-22 DIAGNOSIS — J32.0 CHRONIC MAXILLARY SINUSITIS: ICD-10-CM

## 2024-04-22 DIAGNOSIS — J31.0 CHRONIC RHINITIS: ICD-10-CM

## 2024-04-22 DIAGNOSIS — J33.9 NASAL POLYP, UNSPECIFIED: ICD-10-CM

## 2024-04-22 PROCEDURE — 99214 OFFICE O/P EST MOD 30 MIN: CPT | Mod: 25

## 2024-04-22 PROCEDURE — 95076 INGEST CHALLENGE INI 120 MIN: CPT

## 2024-04-22 PROCEDURE — 95079 INGEST CHALLENGE ADDL 60 MIN: CPT

## 2024-04-22 PROCEDURE — 94070 EVALUATION OF WHEEZING: CPT

## 2024-04-22 PROCEDURE — 36415 COLL VENOUS BLD VENIPUNCTURE: CPT

## 2024-04-22 PROCEDURE — 99204 OFFICE O/P NEW MOD 45 MIN: CPT | Mod: 25

## 2024-04-22 PROCEDURE — 95012 NITRIC OXIDE EXP GAS DETER: CPT

## 2024-04-22 RX ORDER — FLUTICASONE PROPIONATE 93 UG/1
93 SPRAY, METERED NASAL
Qty: 3 | Refills: 5 | Status: ACTIVE | COMMUNITY
Start: 2024-04-22 | End: 1900-01-01

## 2024-04-23 PROBLEM — J33.9 NASAL POLYP: Status: ACTIVE | Noted: 2024-03-18

## 2024-04-23 NOTE — PLAN
[FreeTextEntry1] :  ASPIRIN CHALLENGE:  Aspirin was administered in incremental doses, vital sign, spirometry/PFT were monitored closely. Challenge was started with 81mg of aspirin and escalated to 325mg of aspirin, which was tolerated without any issues. Patient was observed for 2 hours after the last dose and discharged in stable condition.  The patient had NEGATIVE  ASPIRIN CHALLENGE,  which is NOT consistent with Aspirin Exacerbated Respiratory Disease (AERD), therefore long-term aspirin treatment is not indicated. - Other  differential diagnosis of chronic sinusitis with nasal polyposis are Primary Ciliary dyskinesia (PCD) and Cystic fibrosis. Benefit investigation for genetics CF/PCD request sent.  -Rx and information for sweat test was provided.  -Xhance was added. Continue budesonide rinse.  -Follow up with ENT.  -RTO in one month, after reviewing all the results, will consider imitation of Dupilumab.  -Dupilumab, has shown efficacy with nasal steroid in reducing regrowth of the nasal polyps,

## 2024-04-23 NOTE — HISTORY OF PRESENT ILLNESS
[Consent obtained and signed form scanned in to chart] : Consent obtained and signed form scanned in to chart [Diphenhydramine] : Diphenhydramine, 1-2mg/kg IM (max dose 50mg), (50mg/1 cc) [Solucortef] : Solucortef, 4-8 mg/kg IM (max dose 200 mg), (100mg/2 cc) [Epinephrine 1:1000 IM] : Epinephrine 1:1000 IM, 0.01cc/kg (max dose 0.5 cc) [Albuterol MDI] : Albuterol MDI, 2 - 4 puffs [Albuterol nebulized] : Albuterol nebulized, 0.083% [_______] : Time: [unfilled] [Clear] : Skin Findings: Clear [No] : Reaction: No [___] : RR: [unfilled]  [____] : IVB: [unfilled] [___] : Time: [unfilled] [___% 1) Skin -  A) Erythematous rash - % area involved] : Erythematous Rash (IA): [unfilled] % area involved [0 Pruritus: 0  - absent] : Pruritus (IB): 0 - absent [0 Urticaria/Angioedema: 0 - Absent] : Urticaria/Angioedema (IC): 0  - Absent [0 Rash: 0 - Absent] : Rash (ID): 0 - Absent [0 Sneezing/Itchin - Absent] : Sneezing/Itching (IIA): 0 - Absent [0 Nasal congestion: 0 - Absent] : Nasal congestion (IIB): 0 - Absent [0 Rhinorrhea: 0 - Absent] : Rhinorrhea (IIC): 0 - Absent [0 Laryngeal: 0 - Absent] : Laryngeal (IID): 0 - Absent [0 Wheezin - Absent] : Wheezing (IIIA): 0 - Absent [0 Gastro-Subjective complaints: 0 - Absent] : Gastro-Subjective Complaints (LESLEE): 0 - Absent [0 Gastro-Objective complaints: 0 - Absent] : Gastro-Objective Complaints (IVB): 0 - Absent [Antihistamine use in past 5 days] : No antihistamine use in past 5 days [Recent Illness] : no recent illness [Fever] : no fever [Asthma] : no asthma [Asthma well controlled?] : asthma well controlled: no [de-identified] : PANDA ROMANO is a 34 year old male with chronic rhinosinusitis with nasal polyps, here for Aspirin challenge.   Patient was referred by Dr Campos.  = Path- polyp, scattered eosinophils =AR- All negative.   CHRONIC RHINOSINUSITIS Nasal polyposis- Yes Patient has had 1 sinus surgeries. Last surgery was in 3/2/2023. Over the last year he received 1 courses of antibiotics and steroids. His sense of smell is and sense of taste are fine. Nasal steroids: Budesonide, takes about 2 /week  There is no history of ASTHMA. He does have exertional SOB that he attributes to wearing a mask  No history of NSAIDs reactions. He hasnt taken them in years.  History of respiratory symptoms after alcohol ingestion: he doesnt drink for Taoist reasons  History of hives: No  History of GI bleeding or ulcers: No  Race/Ethnicity: [place]   - Nasal endoscopy by Dr Dr Campos 10/2023- allergic swollen tissue bilaterally with polyp formation. - 3/2/2023- 1st sinus surgery, bilateral septoplasty w/turbinectomy  Previously had bad nasal congestion that is improved after surgery Starting to have a little increased secretions on the right side, increased congestion on the left side saline and budesonide sinus rinses are flushing out secretions - doing 2x a day Symptoms are worst during winter - exposure to cold weather  PMHx: preseptal cellulitis, deviated septum.   No history or symptoms of asthma, eczematous rashes, venom reactions, food allergies, drug allergies.  Active Problems [FreeTextEntry1] : Aspirin  [FreeTextEntry2] : 405

## 2024-04-23 NOTE — END OF VISIT
[FreeTextEntry3] : I, Dr. Reina Hughes, personally performed the evaluation and management (E/M) services for this established patient who presents today with (a) new problem(s)/exacerbation of (an) existing condition(s).  That E/M includes conducting the examination, assessing all new/exacerbated conditions, and establishing a new plan of care.  Today, my SARINA, CiviQ, was here to observe my evaluation and management services for this new problem/exacerbated condition to be followed going forward.

## 2024-04-23 NOTE — REVIEW OF SYSTEMS
[Nasal Congestion] : nasal congestion [Nl] : Integumentary [Fatigue] : no fatigue [Fever] : no fever [Eye Discharge] : no eye discharge [Eye Redness] : no redness [Puffy Eyelids] : no puffy ~T eyelids [Bloodshot Eyes] : no bloodshot ~T eyes [Post Nasal Drip] : no post nasal drip [Sneezing] : no sneezing [Nocturnal Awakening] : no nocturnal awakening with shortness of breath [Wheezing Worsens With Exercise] : wheezing does not worsen with exercise [Wheezing] : no wheezing [Vomiting] : no vomiting [Diarrhea] : no diarrhea [FreeTextEntry6] : Intermittent cough

## 2024-04-23 NOTE — PHYSICAL EXAM
[Alert] : alert [Well Nourished] : well nourished [No Acute Distress] : no acute distress [Well Developed] : well developed [Sclera Not Icteric] : sclera not icteric [Normal TMs] : both tympanic membranes were normal [No Neck Mass] : no neck mass was observed [Normal Rate and Effort] : normal respiratory rhythm and effort [No Crackles] : no crackles [Bilateral Audible Breath Sounds] : bilateral audible breath sounds [Normal Rate] : heart rate was normal  [Normal S1, S2] : normal S1 and S2 [No murmur] : no murmur [Regular Rhythm] : with a regular rhythm [Skin Intact] : skin intact  [No Rash] : no rash [Conjunctival Erythema] : no conjunctival erythema [Boggy Nasal Turbinates] : no boggy and/or pale nasal turbinates [Pharyngeal erythema] : no pharyngeal erythema [Posterior Pharyngeal Cobblestoning] : no posterior pharyngeal cobblestoning [Clear Rhinorrhea] : no clear rhinorrhea was seen [Exudate] : no exudate [Wheezing] : no wheezing was heard [Patches] : no patches [Urticaria] : no urticaria

## 2024-04-24 LAB
BASOPHILS # BLD AUTO: 0.06 K/UL
BASOPHILS NFR BLD AUTO: 0.9 %
CH50 SERPL-MCNC: 78 U/ML
EOSINOPHIL # BLD AUTO: 0.14 K/UL
EOSINOPHIL NFR BLD AUTO: 2.2 %
HCT VFR BLD CALC: 46.6 %
HGB BLD-MCNC: 14.5 G/DL
IMM GRANULOCYTES NFR BLD AUTO: 0.2 %
LYMPHOCYTES # BLD AUTO: 2.42 K/UL
LYMPHOCYTES NFR BLD AUTO: 38.3 %
MAN DIFF?: NORMAL
MCHC RBC-ENTMCNC: 29.6 PG
MCHC RBC-ENTMCNC: 31.1 GM/DL
MCV RBC AUTO: 95.1 FL
MONOCYTES # BLD AUTO: 0.73 K/UL
MONOCYTES NFR BLD AUTO: 11.6 %
NEUTROPHILS # BLD AUTO: 2.96 K/UL
NEUTROPHILS NFR BLD AUTO: 46.8 %
PLATELET # BLD AUTO: 279 K/UL
RBC # BLD: 4.9 M/UL
RBC # FLD: 11.9 %
TRYPTASE: 4.2 UG/L
WBC # FLD AUTO: 6.32 K/UL

## 2024-05-03 LAB
2,3-DINOR-11B-PROSTAGLANDIN F2A, RANDOM URINE: 997 PG/MG CR
CREATININE RANDOM URINE: 33 MG/DL
CREATININE RANDOM URINE: 34 MG/DL
LEUKOTRIENE E4, URINE: 261 PG/MG CR

## 2024-05-17 ENCOUNTER — APPOINTMENT (OUTPATIENT)
Dept: OTOLARYNGOLOGY | Facility: CLINIC | Age: 34
End: 2024-05-17

## 2024-05-31 NOTE — PROCEDURE NOTE - NSINDICATIONS_GEN_A_CORE
Patient here today for nurse blood pressure check.  Last dose of BP medication taken:  05/31/2024    BP Readings from Last 1 Encounters:   05/31/24 1403 108/80     Pulse Readings from Last 1 Encounters:   05/31/24 88     Last Clinician Visit for this condition: 05/09/2024  Per that visit, plan of care: Return for nurse BP check   Next office visit is scheduled for: 11/15/2024    Current BP Medications are:     amLODIPine (NORVASC) 5 MG tablet 90 tablet 2 5/9/2024 --    Sig - Route: Take 1 tablet by mouth daily. - Oral      hydroCHLOROthiazide 25 MG tablet 90 tablet 2 5/9/2024 --    Sig - Route: Take 1 tablet by mouth daily. - Oral      losartan (COZAAR) 100 MG tablet 90 tablet 2 5/9/2024 --    Sig - Route: Take 1 tablet by mouth daily. - Oral    Please review and advise if there are any changes to current plan of care.    Pharm[facility]: [facility]  Pt miguel CP, SOB, HA, dizziness, blurred vision.   abscess

## 2024-12-20 ENCOUNTER — APPOINTMENT (OUTPATIENT)
Dept: OTOLARYNGOLOGY | Facility: CLINIC | Age: 34
End: 2024-12-20

## (undated) DEVICE — BLADE MEDTRONIC ENT FUSION TRICUT ROTATABLE STRAIGHT 4MM X 13CM

## (undated) DEVICE — SUT ETHILON 5-0 18" P-3

## (undated) DEVICE — APPLICATOR COTTON TIP 6" STERLE

## (undated) DEVICE — DRSG SPLINT INTRA NASAL .5MM OVERSIZE THICK

## (undated) DEVICE — BIPOLAR FORCEP CORD WECK STANDARD 12FT

## (undated) DEVICE — WARMING BLANKET FULL ADULT

## (undated) DEVICE — MEDTRONIC INFLATOR KIT FOR NUVENT EM SINUS DILATION SYSTEM

## (undated) DEVICE — POSITIONER STRAP ARMBOARD VELCRO TS-30

## (undated) DEVICE — VISITEC 4X4

## (undated) DEVICE — ELCTR ROCKER SWITCH PENCIL BLUE 10FT

## (undated) DEVICE — ELCTR GROUNDING PAD ADULT COVIDIEN

## (undated) DEVICE — SUT VICRYL 5-0 27" RB-1

## (undated) DEVICE — LABELS BLANK W PEN

## (undated) DEVICE — BLADE SURGICAL #12 CARBON STEEL

## (undated) DEVICE — Device

## (undated) DEVICE — CATH IV SAFE INSYTE 14G X 1.75" (ORANGE)

## (undated) DEVICE — DRSG MEROCEL 2000 WITH STRING 8CM

## (undated) DEVICE — SUT VICRYL 6-0 27" RB-1 UNDYED

## (undated) DEVICE — SOL IRR POUR NS 0.9% 500ML

## (undated) DEVICE — COTTONBALL LG

## (undated) DEVICE — SUT ETHILON 4-0 18" P-3

## (undated) DEVICE — DRAPE SPLIT SHEET 77" X 120"

## (undated) DEVICE — SUT PLAIN GUT 4-0 18" SC-1

## (undated) DEVICE — DRSG STERISTRIPS 0.5 X 4"

## (undated) DEVICE — BLADE SURGICAL #15 CARBON

## (undated) DEVICE — ELCTR BOVIE TIP NEEDLE INSULATED 2.8" EDGE

## (undated) DEVICE — PACKING GAUZE PLAIN 0.5"

## (undated) DEVICE — SUT CHROMIC 4-0 18" G-2

## (undated) DEVICE — MEDTRONIC INSTRUMENT TRACKER ENT

## (undated) DEVICE — PACK MINOR NO DRAPE

## (undated) DEVICE — DRSG SPLINT NASAL THERMA MED

## (undated) DEVICE — SUT VICRYL 6-0 18" P-1 UNDYED

## (undated) DEVICE — MARKING PEN W RULER

## (undated) DEVICE — BLADE SURGICAL #11 CARBON

## (undated) DEVICE — GLV 7.5 PROTEXIS (WHITE)

## (undated) DEVICE — SYR LUER LOK 5CC

## (undated) DEVICE — SOL BALANCE SALT 15ML

## (undated) DEVICE — PROTECTOR CORNEAL BLUE ADULT

## (undated) DEVICE — SUT VICRYL 4-0 18" P-3 UNDYED

## (undated) DEVICE — WARMING BLANKET LOWER ADULT

## (undated) DEVICE — DRSG MASTISOL

## (undated) DEVICE — SUT PLAIN GUT FAST ABSORBING 5-0 PC-1

## (undated) DEVICE — GLV 7 PROTEXIS (WHITE)

## (undated) DEVICE — TUBING IRRIGATION STRAIGHT SHOT

## (undated) DEVICE — MEDTRONIC AXIEM PATIENT TRACKER NON-INVASIVE

## (undated) DEVICE — DRSG CURITY GAUZE SPONGE 4 X 4" 12-PLY

## (undated) DEVICE — BEAVER BLADE MINI (ORANGE)

## (undated) DEVICE — PACK SMR

## (undated) DEVICE — DRSG STERISTRIPS 0.25 X 3"

## (undated) DEVICE — DRSG EYE PAD OVAL STERILE